# Patient Record
Sex: FEMALE | Race: AMERICAN INDIAN OR ALASKA NATIVE
[De-identification: names, ages, dates, MRNs, and addresses within clinical notes are randomized per-mention and may not be internally consistent; named-entity substitution may affect disease eponyms.]

---

## 2019-04-14 ENCOUNTER — HOSPITAL ENCOUNTER (EMERGENCY)
Dept: HOSPITAL 5 - ED | Age: 74
Discharge: HOME | End: 2019-04-14
Payer: COMMERCIAL

## 2019-04-14 VITALS — SYSTOLIC BLOOD PRESSURE: 93 MMHG | DIASTOLIC BLOOD PRESSURE: 54 MMHG

## 2019-04-14 DIAGNOSIS — E11.9: ICD-10-CM

## 2019-04-14 DIAGNOSIS — I10: ICD-10-CM
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
## 2019-04-14 DIAGNOSIS — S90.02XA: ICD-10-CM
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
## 2019-04-14 DIAGNOSIS — S80.01XA: ICD-10-CM

## 2019-04-14 DIAGNOSIS — S80.02XA: Primary | ICD-10-CM

## 2019-04-14 DIAGNOSIS — F17.200: ICD-10-CM

## 2019-04-14 PROCEDURE — 99283 EMERGENCY DEPT VISIT LOW MDM: CPT

## 2019-04-14 PROCEDURE — 73630 X-RAY EXAM OF FOOT: CPT

## 2019-04-14 PROCEDURE — 96372 THER/PROPH/DIAG INJ SC/IM: CPT

## 2019-04-14 PROCEDURE — 73610 X-RAY EXAM OF ANKLE: CPT

## 2019-04-14 PROCEDURE — 73562 X-RAY EXAM OF KNEE 3: CPT

## 2019-04-14 NOTE — XRAY REPORT
EXAM:    XR ANKLE 3+V LT 

 

HISTORY:  injury/FALL 

 

TECHNIQUE: 3 views 

 

COMPARISON: None available. 

 

FINDINGS:  

 

There is no acute bony fracture, or joint subluxation or dislocation seen. No evidence for inflammato
ry or degenerative arthritis is seen. No focal bone erosion or sclerosis is seen. There is a small, a
pproximately 7.8 mm, Achilles calcaneal bone spur in keeping with sequela of enthesopathy. 

 

There is peripheral atherosclerosis. Soft tissue swelling is seen around the ankle. No soft tissue em
physema, radiodense soft tissue mass or foreign body is seen. 

 

IMPRESSION:  

 

1.  No acute bony fracture, or joint subluxation or dislocation seen. 

 

2. Soft tissue swelling around the ankle. 

 

3.  No soft tissue emphysema, radiodense soft tissue mass or foreign body seen. 

 

4.  Consider follow-up evaluation with sectional imaging (CT and/or MRI) if symptoms persist or worse
n. 

 

  

 

This document is electronically signed by Miriam Levine MD., April 14 2019 09:51:19 AM [facility]

## 2019-04-14 NOTE — EMERGENCY DEPARTMENT REPORT
ED Fall HPI





- General


Chief Complaint: Extremity Injury, Lower


Stated Complaint: BILATERAL KNEE PAIN/FALL STANDING


Time Seen by Provider: 04/14/19 08:38


Source: EMS


Mode of arrival: Stretcher





- History of Present Illness


Initial Comments: 





Patient is 73 years old female with history of hypertension.  Patient brought to

the emergency room via EMS for evaluation of a fall that happened yesterday 

evening.  Patient stated that she was pushed by her dog and fell on both knees. 

Patient is complaining of bilateral knee pain and swelling and left ankle and 

foot pain.  Patient denied any head injury, neck injury, chest injury, abdominal

injury or other extremities injury.  Patient denied any loss of consciousness, 

headache, weakness, numbness or tingling sensation.  No bowel or bladder 

incontinence.  Patient denied any symptoms prior to the fall.


MD Complaint: fall


-: Last night


Fall From: standing


When Fall Occurred: 24 hours PTA


Fall Witnessed: yes, by family


Place Fall Occurred: home


Loss of Consciousness: none


Prolonged Down Time?: no


Symptoms Prior to Fall: none


Location - Extremities: Left: Knee, Ankle, Foot, Right: Knee


Severity: moderate


Severity scale (0 -10): 6


Associated Symptoms: denies





ED Review of Systems


ROS: 


Stated complaint: BILATERAL KNEE PAIN/FALL STANDING


Other details as noted in HPI





Comment: All other systems reviewed and negative


Constitutional: denies: chills, fever


Respiratory: denies: cough, orthopnea, shortness of breath, SOB with exertion


Cardiovascular: denies: chest pain, palpitations


Gastrointestinal: denies: abdominal pain, nausea, vomiting, diarrhea, 

constipation, hematemesis, melena, hematochezia


Genitourinary: denies: dysuria


Musculoskeletal: denies: back pain


Neurological: denies: headache, weakness, numbness, paresthesias, confusion





ED Past Medical Hx





- Past Medical History


Previous Medical History?: Yes


Hx Hypertension: Yes


Hx Diabetes: Yes





- Surgical History


Past Surgical History?: No





- Social History


Smoking Status: Current Every Day Smoker


Substance Use Type: None





ED Physical Exam





- General


Limitations: Physical Limitation


General appearance: alert, in no apparent distress





- Head


Head exam: Present: atraumatic, normocephalic, normal inspection





- Eye


Eye exam: Present: normal appearance, PERRL





- ENT


ENT exam: Present: normal exam, normal orophraynx, mucous membranes moist





- Neck


Neck exam: Present: normal inspection, full ROM.  Absent: tenderness, 

meningismus, lymphadenopathy, thyromegaly





- Respiratory


Respiratory exam: Present: normal lung sounds bilaterally





- Cardiovascular


Cardiovascular Exam: Present: regular rate, normal rhythm, normal heart sounds





- GI/Abdominal


GI/Abdominal exam: Present: soft, normal bowel sounds.  Absent: distended, 

tenderness, guarding, rebound, rigid, organomegaly, mass, bruit, pulsatile mass





- Expanded Lower Extremity Exam


  ** Right


Hip exam: Present: normal inspection, full ROM.  Absent: tenderness, swelling, 

abrasion


Upper Leg exam: Present: normal inspection, full ROM.  Absent: tenderness, 

swelling


Knee exam: Present: normal inspection, full ROM, tenderness, swelling.  Absent: 

abrasion, laceration, ecchymosis, deformity, crepidus, dislocation, erythema, 

effusion


Lower Leg exam: Present: normal inspection, full ROM


Ankle exam: Present: normal inspection, full ROM.  Absent: tenderness, swelling


Foot/Toe exam: Present: normal inspection, full ROM.  Absent: tenderness, 

swelling, abrasion, laceration, dislocation


Neuro vascular tendon exam: Present: no vascular compromise





  ** Left


Hip exam: Present: normal inspection, full ROM.  Absent: tenderness, swelling, 

abrasion


Upper Leg exam: Present: normal inspection, full ROM.  Absent: tenderness, 

swelling


Knee exam: Present: tenderness, swelling.  Absent: full ROM, abrasion, lacerati

on, ecchymosis, deformity, crepidus, dislocation, erythema, effusion, pain w/ 

pronation/supination


Lower Leg exam: Present: normal inspection, full ROM.  Absent: tenderness, s

welling, abrasion, deformity


Ankle exam: Present: tenderness, swelling.  Absent: abrasion, laceration, 

ecchymosis


Foot/Toe exam: Present: tenderness, swelling.  Absent: full ROM


Neuro vascular tendon exam: Present: no vascular compromise





- Back Exam


Back exam: Present: normal inspection





- Neurological Exam


Neurological exam: Present: alert, oriented X3, CN II-XII intact





- Skin


Skin exam: Present: warm, intact, normal color





ED Course


                                   Vital Signs











  04/14/19 04/14/19 04/14/19





  08:30 08:32 08:45


 


Temperature  99.1 F 


 


Pulse Rate 82 80 82


 


Respiratory 17 20 26 H





Rate   


 


Blood Pressure  126/71 110/66


 


O2 Sat by Pulse  98 95





Oximetry   














  04/14/19 04/14/19 04/14/19





  09:00 09:15 09:30


 


Temperature   


 


Pulse Rate 81 79 85


 


Respiratory 21 21 20





Rate   


 


Blood Pressure 124/73 122/69 111/75


 


O2 Sat by Pulse 96 96 95





Oximetry   














ED Medical Decision Making





- Radiology Data


Radiology results: report reviewed





X-ray of both knees, left ankle and left foot are all negative for acute 

finding.





- Medical Decision Making





Patient is 73 years old female with history of hypertension.  Patient brought to

the emergency room via EMS for evaluation of a fall that happened yesterday 

evening.  Patient stated that she was pushed by her dog and fell on both knees. 

Patient is complaining of bilateral knee pain and swelling and left ankle and 

foot pain.  Patient denied any head injury, neck injury, chest injury, abdominal

injury or other extremities injury.  Patient denied any loss of consciousness, 

headache, weakness, numbness or tingling sensation.  No bowel or bladder 

incontinence.  Patient denied any symptoms prior to the fall.








Patient x-ray is reviewed and is negative for acute finding.  Patient stated 

that she is feeling much better.  I advised the patient to follow up with her 

primary care physician in the next 2-3 days and to return to the ER if symptoms 

not improved.


Critical care attestation.: 


If time is entered above; I have spent that time in minutes in the direct care 

of this critically ill patient, excluding procedure time.








ED Disposition


Clinical Impression: 


 Fall, Contusion





Disposition: DC-01 TO HOME OR SELFCARE


Is pt being admited?: No


Condition: Stable


Instructions:  Contusion in Adults (ED), Fall Prevention for Older Adults (ED)


Referrals: 


PRIMARY CARE,MD [Primary Care Provider] - 3-5 Days

## 2019-04-14 NOTE — XRAY REPORT
EXAM:    XR FOOT 3+V LT 

 

HISTORY:  injury 

 

TECHNIQUE: 3 views 

 

COMPARISON: None available. 

 

FINDINGS:  

 

There is diffuse osteopenia. There is no acute bony fracture, or joint subluxation or dislocation see
n. No evidence for inflammatory or degenerative arthritis is seen. No focal bone erosion or sclerosis
 is seen. No plantar or Achilles calcaneal bone spurs seen. No soft tissue emphysema, radiodense soft
 tissue abnormality or foreign body is seen. 

 

IMPRESSION:  

 

1.  No acute bony fracture, or joint subluxation or dislocation seen. 

 

2.  No soft tissue emphysema, radiodense soft tissue abnormality or foreign body seen. 

 

  

 

This document is electronically signed by Miriam Levine MD., April 14 2019 10:09:15 AM ET

## 2019-04-14 NOTE — XRAY REPORT
EXAM:    XR KNEE BILAT 3V 

 

HISTORY: injury 

 

TECHNIQUE: 6 views 

 

COMPARISON: None available.  

 

FINDINGS:  

 

RIGHT KNEE: Diffuse osteopenia. There is no acute bony fracture, or joint subluxation or dislocation 
seen.  No focal bone erosion or sclerosis is seen. 

 

There is tricompartment osteoarthritis of the knee (especially severe in the medial joint compartment
), marked by joint space narrowings and prominent marginal osteophytosis. No evidence for inflammator
y arthritis is seen. 

 

There is a suprapatellar soft tissue density reminiscent of a gross joint effusion seen. There is sev
ere SFA, popliteal artery, and trifurcation runoff vessel atherosclerosis. No radiodense soft tissue 
mass or foreign body is seen. 

 

LEFT KNEE: Diffuse osteopenia. There is no acute bony fracture, or joint subluxation or dislocation s
een.  No focal bone erosion or sclerosis is seen. 

 

There is tricompartment osteoarthritis of the knee (especially severe in the medial joint compartment
), marked by joint space narrowings and prominent marginal osteophytosis. No evidence for inflammator
y arthritis is seen. 

 

There is a suprapatellar soft tissue density reminiscent of a gross joint effusion seen. There is sev
ere SFA, popliteal artery, and trifurcation runoff vessel atherosclerosis. No radiodense soft tissue 
mass or foreign body is seen. 

 

 

IMPRESSION:  

 

1.  No acute bony fracture, or joint subluxation or dislocation seen. 

 

2.  Moderate to severe bilateral tricompartment osteoarthritis, especially the medial joint compartme
nts and patellofemoral joints. 

 

3.  Large bilateral joint effusions with distention of the suprapatellar bursae. 

 

4.  Severe peripheral atherosclerosis.  

 

This document is electronically signed by Miriam Levine MD., April 14 2019 09:49:00 AM ET

## 2021-03-31 ENCOUNTER — OUT OF OFFICE VISIT (OUTPATIENT)
Dept: URBAN - METROPOLITAN AREA MEDICAL CENTER 12 | Facility: MEDICAL CENTER | Age: 76
End: 2021-03-31
Payer: COMMERCIAL

## 2021-03-31 DIAGNOSIS — R13.19 CERVICAL DYSPHAGIA: ICD-10-CM

## 2021-03-31 DIAGNOSIS — K31.89 ACQUIRED DEFORMITY OF DUODENUM: ICD-10-CM

## 2021-03-31 DIAGNOSIS — K22.4 ESOPHAGEAL DYSMOTILITY: ICD-10-CM

## 2021-03-31 PROCEDURE — 43239 EGD BIOPSY SINGLE/MULTIPLE: CPT | Performed by: INTERNAL MEDICINE

## 2021-03-31 PROCEDURE — 43450 DILATE ESOPHAGUS 1/MULT PASS: CPT | Performed by: INTERNAL MEDICINE

## 2021-03-31 PROCEDURE — G8427 DOCREV CUR MEDS BY ELIG CLIN: HCPCS | Performed by: INTERNAL MEDICINE

## 2021-03-31 PROCEDURE — 99222 1ST HOSP IP/OBS MODERATE 55: CPT | Performed by: INTERNAL MEDICINE

## 2022-06-18 ENCOUNTER — HOSPITAL ENCOUNTER (INPATIENT)
Dept: HOSPITAL 5 - ED | Age: 77
LOS: 3 days | Discharge: HOME | DRG: 291 | End: 2022-06-21
Attending: INTERNAL MEDICINE | Admitting: HOSPITALIST
Payer: MEDICARE

## 2022-06-18 DIAGNOSIS — I50.23: ICD-10-CM

## 2022-06-18 DIAGNOSIS — Z86.73: ICD-10-CM

## 2022-06-18 DIAGNOSIS — I11.0: Primary | ICD-10-CM

## 2022-06-18 DIAGNOSIS — Z71.6: ICD-10-CM

## 2022-06-18 DIAGNOSIS — E11.65: ICD-10-CM

## 2022-06-18 DIAGNOSIS — G47.33: ICD-10-CM

## 2022-06-18 DIAGNOSIS — J96.01: ICD-10-CM

## 2022-06-18 DIAGNOSIS — Z85.3: ICD-10-CM

## 2022-06-18 DIAGNOSIS — F17.200: ICD-10-CM

## 2022-06-18 DIAGNOSIS — Z82.49: ICD-10-CM

## 2022-06-18 DIAGNOSIS — E66.9: ICD-10-CM

## 2022-06-18 DIAGNOSIS — M19.90: ICD-10-CM

## 2022-06-18 DIAGNOSIS — I48.0: ICD-10-CM

## 2022-06-18 DIAGNOSIS — Z79.01: ICD-10-CM

## 2022-06-18 DIAGNOSIS — Z90.710: ICD-10-CM

## 2022-06-18 LAB
ALBUMIN SERPL-MCNC: 4.1 G/DL (ref 3.9–5)
ALT SERPL-CCNC: 110 UNITS/L (ref 7–56)
BASOPHILS # (AUTO): 0 K/MM3 (ref 0–0.1)
BASOPHILS NFR BLD AUTO: 0.5 % (ref 0–1.8)
BUN SERPL-MCNC: 12 MG/DL (ref 7–17)
BUN/CREAT SERPL: 17 %
CALCIUM SERPL-MCNC: 9.6 MG/DL (ref 8.4–10.2)
EOSINOPHIL # BLD AUTO: 0 K/MM3 (ref 0–0.4)
EOSINOPHIL NFR BLD AUTO: 0.1 % (ref 0–4.3)
HCO3 BLDA-SCNC: 20.4 MMOL/L (ref 20–26)
HCT VFR BLD CALC: 33.9 % (ref 30.3–42.9)
HEMOLYSIS INDEX: 4
HGB BLD-MCNC: 10.5 GM/DL (ref 10.1–14.3)
LYMPHOCYTES # BLD AUTO: 0.8 K/MM3 (ref 1.2–5.4)
LYMPHOCYTES NFR BLD AUTO: 16 % (ref 13.4–35)
MCHC RBC AUTO-ENTMCNC: 31 % (ref 30–34)
MCV RBC AUTO: 74 FL (ref 79–97)
MONOCYTES # (AUTO): 0.2 K/MM3 (ref 0–0.8)
MONOCYTES % (AUTO): 4.4 % (ref 0–7.3)
PCO2 BLDA: 44.7 MM HG
PH BLDA: 7.28 PH UNITS (ref 7.35–7.45)
PLATELET # BLD: 180 K/MM3 (ref 140–440)
PO2 BLDA: 178.4 MM HG (ref 80–90)
RBC # BLD AUTO: 4.61 M/MM3 (ref 3.65–5.03)

## 2022-06-18 PROCEDURE — 80053 COMPREHEN METABOLIC PANEL: CPT

## 2022-06-18 PROCEDURE — 85730 THROMBOPLASTIN TIME PARTIAL: CPT

## 2022-06-18 PROCEDURE — 84484 ASSAY OF TROPONIN QUANT: CPT

## 2022-06-18 PROCEDURE — 94640 AIRWAY INHALATION TREATMENT: CPT

## 2022-06-18 PROCEDURE — 4A033R1 MEASUREMENT OF ARTERIAL SATURATION, PERIPHERAL, PERCUTANEOUS APPROACH: ICD-10-PCS | Performed by: HOSPITALIST

## 2022-06-18 PROCEDURE — 71045 X-RAY EXAM CHEST 1 VIEW: CPT

## 2022-06-18 PROCEDURE — 94644 CONT INHLJ TX 1ST HOUR: CPT

## 2022-06-18 PROCEDURE — 82803 BLOOD GASES ANY COMBINATION: CPT

## 2022-06-18 PROCEDURE — 36415 COLL VENOUS BLD VENIPUNCTURE: CPT

## 2022-06-18 PROCEDURE — 85610 PROTHROMBIN TIME: CPT

## 2022-06-18 PROCEDURE — 80048 BASIC METABOLIC PNL TOTAL CA: CPT

## 2022-06-18 PROCEDURE — 82565 ASSAY OF CREATININE: CPT

## 2022-06-18 PROCEDURE — 82962 GLUCOSE BLOOD TEST: CPT

## 2022-06-18 PROCEDURE — 84443 ASSAY THYROID STIM HORMONE: CPT

## 2022-06-18 PROCEDURE — 85025 COMPLETE CBC W/AUTO DIFF WBC: CPT

## 2022-06-18 PROCEDURE — 85027 COMPLETE CBC AUTOMATED: CPT

## 2022-06-18 PROCEDURE — 5A09357 ASSISTANCE WITH RESPIRATORY VENTILATION, LESS THAN 24 CONSECUTIVE HOURS, CONTINUOUS POSITIVE AIRWAY PRESSURE: ICD-10-PCS | Performed by: HOSPITALIST

## 2022-06-18 PROCEDURE — 93005 ELECTROCARDIOGRAM TRACING: CPT

## 2022-06-18 PROCEDURE — 83880 ASSAY OF NATRIURETIC PEPTIDE: CPT

## 2022-06-18 PROCEDURE — 99292 CRITICAL CARE ADDL 30 MIN: CPT

## 2022-06-18 RX ADMIN — Medication SCH ML: at 21:30

## 2022-06-18 RX ADMIN — IPRATROPIUM BROMIDE AND ALBUTEROL SULFATE SCH AMPUL: .5; 3 SOLUTION RESPIRATORY (INHALATION) at 21:26

## 2022-06-18 RX ADMIN — FAMOTIDINE SCH MG: 20 TABLET ORAL at 21:30

## 2022-06-18 RX ADMIN — HEPARIN SODIUM SCH UNIT: 5000 INJECTION, SOLUTION INTRAVENOUS; SUBCUTANEOUS at 21:30

## 2022-06-18 NOTE — HISTORY AND PHYSICAL REPORT
History of Present Illness


Date of examination: 06/18/22


Date of admission: 


06/18/22


Chief complaint: 





Dyspnea


Respiratory distress


History of present illness: 





76 years old female with history of high blood duration, diabetes and A. maribel was

brought to the emergency room because of shortness of breath that occurred 

today.  Patient was stating that she had some difficulty breathing and then went

unresponsive.  History is limited due to acuity of patient's condition.  EMS 

gave the patient follow-up of her son to calm her down because she wanted to 

leave the ambulance truck.  She has a history of atrial fibrillation she does 

not smoke.








In the emergency room patient BNP is 3670, troponin 0.010.  Chest x-ray 

compatible with CHF and glucose is 523.  We are going to admit the patient we 

will put the patient on CHF pathway, BiPAP and consult cardiology for evaluation





Past History


Past Medical History: atrial fib, diabetes, hypertension


Past Surgical History: No surgical history


Social history: smoking


Family history: hypertension





Medications and Allergies


                                    Allergies











Allergy/AdvReac Type Severity Reaction Status Date / Time


 


No Known Allergies Allergy   Verified 06/18/22 19:45











                                Home Medications











 Medication  Instructions  Recorded  Confirmed  Last Taken  Type


 


Ondansetron [Zofran Odt] 4 mg PO Q8HR PRN #14 tab.rapdis 04/14/19  Unknown Rx


 


traMADoL [Ultram 50 MG tab] 50 mg PO Q4HR PRN #14 tablet 04/14/19  Unknown Rx











Active Meds: 


Active Medications





Acetaminophen (Acetaminophen 325 Mg Tab)  650 mg PO Q4H PRN


   PRN Reason: Pain MILD(1-3)/Fever >100.5/HA


Albuterol (Albuterol 2.5 Mg/3 Ml Nebu)  2.5 mg IH Q3HRT PRN


   PRN Reason: Shortness Of Breath


Albuterol/Ipratropium (Ipratropium/Albuterol Sulfate 3 Ml Ampul.Neb)  1 ampul IH

Q6HRT ISAAC


Dextrose (Dextrose 50% In Water (25gm) 50 Ml Syringe)  50 ml IV Q30MIN PRN; 

Protocol


   PRN Reason: Hypoglycemia


Famotidine (Famotidine 20 Mg Tab)  20 mg PO BID ISAAC


Furosemide (Furosemide 40 Mg/4 Ml Inj)  40 mg IV BID@0600,1800 ISAAC


Heparin Sodium (Porcine) (Heparin 5,000 Unit/1 Ml Vial)  5,000 unit SUB-Q Q12HR 

ISAAC


Insulin Human Lispro (Insulin Lispro 100 Unit/Ml)  0 unit SUB-Q Q6HR ISAAC; 

Protocol


Morphine Sulfate (Morphine 2 Mg/1 Ml Inj)  2 mg IV Q4H PRN


   PRN Reason: Pain, Moderate (4-6)


Morphine Sulfate (Morphine 4 Mg/1 Ml Inj)  4 mg IV Q4H PRN


   PRN Reason: Pain , Severe (7-10)


Ondansetron HCl (Ondansetron 4 Mg/2 Ml Inj)  4 mg IV Q8H PRN


   PRN Reason: Nausea And Vomiting


Sodium Chloride (Sodium Chloride 0.9% 10 Ml Flush Syringe)  10 ml IV BID ISAAC


Sodium Chloride (Sodium Chloride 0.9% 10 Ml Flush Syringe)  10 ml IV PRN PRN


   PRN Reason: LINE FLUSH











Review of Systems


All systems: negative


Cardiovascular: orthopnea, edema, shortness of breath, dyspnea on exertion, 

paroxysmal nocturnal dyspnea


Respiratory: shortness of breath, dyspnea on exertion





Exam





- Constitutional


Vitals: 


                                        











Temp Pulse Resp BP Pulse Ox


 


    80   28 H  122/82   100 


 


    06/18/22 16:17  06/18/22 16:17  06/18/22 18:42  06/18/22 18:42











General appearance: Present: no acute distress, well-nourished





- EENT


Eyes: Present: PERRL


ENT: hearing intact, clear oral mucosa





- Neck


Neck: Present: supple, normal ROM





- Respiratory


Respiratory effort: normal


Respiratory: bilateral: rales





- Cardiovascular


Heart Sounds: Present: S1 & S2.  Absent: rub, click





- Extremities


Extremities: pulses symmetrical, No edema


Peripheral Pulses: within normal limits





- Abdominal


General gastrointestinal: Present: soft, non-tender, non-distended, normal bowel

sounds


Female genitourinary: Present: normal





- Integumentary


Integumentary: Present: clear, warm, dry





- Musculoskeletal


Musculoskeletal: gait normal, strength equal bilaterally





- Psychiatric


Psychiatric: appropriate mood/affect, intact judgment & insight





- Neurologic


Neurologic: CNII-XII intact, moves all extremities





HEART Score





- HEART Score


Troponin: 


                                        











Troponin T  < 0.010 ng/mL (0.00-0.029)   06/18/22  14:35    














Results





- Labs


CBC & Chem 7: 


                                 06/18/22 14:35





                                 06/18/22 14:35


Labs: 


                             Laboratory Last Values











WBC  5.0 K/mm3 (4.5-11.0)   06/18/22  14:35    


 


RBC  4.61 M/mm3 (3.65-5.03)   06/18/22  14:35    


 


Hgb  10.5 gm/dl (10.1-14.3)   06/18/22  14:35    


 


Hct  33.9 % (30.3-42.9)   06/18/22  14:35    


 


MCV  74 fl (79-97)  L  06/18/22  14:35    


 


MCH  23 pg (28-32)  L  06/18/22  14:35    


 


MCHC  31 % (30-34)   06/18/22  14:35    


 


RDW  15.4 % (13.2-15.2)  H  06/18/22  14:35    


 


Plt Count  180 K/mm3 (140-440)   06/18/22  14:35    


 


Lymph % (Auto)  16.0 % (13.4-35.0)   06/18/22  14:35    


 


Mono % (Auto)  4.4 % (0.0-7.3)   06/18/22  14:35    


 


Eos % (Auto)  0.1 % (0.0-4.3)   06/18/22  14:35    


 


Baso % (Auto)  0.5 % (0.0-1.8)   06/18/22  14:35    


 


Lymph # (Auto)  0.8 K/mm3 (1.2-5.4)  L  06/18/22  14:35    


 


Mono # (Auto)  0.2 K/mm3 (0.0-0.8)   06/18/22  14:35    


 


Eos # (Auto)  0.0 K/mm3 (0.0-0.4)   06/18/22  14:35    


 


Baso # (Auto)  0.0 K/mm3 (0.0-0.1)   06/18/22  14:35    


 


Seg Neutrophils %  79.0 % (40.0-70.0)  H  06/18/22  14:35    


 


Seg Neutrophils #  4.0 K/mm3 (1.8-7.7)   06/18/22  14:35    


 


ABG pH  7.277 pH Units (7.350-7.450)  L  06/18/22  14:15    


 


ABG pCO2  44.7 mm Hg  06/18/22  14:15    


 


ABG pO2  178.4 mm Hg (80.0-90.0)  H  06/18/22  14:15    


 


ABG HCO3  20.4 mmol/L (20.0-26.0)   06/18/22  14:15    


 


ABG O2 Saturation  99.0 % (95.0-99.0)   06/18/22  14:15    


 


ABG O2 Content  15.2  (0.0-44)   06/18/22  14:15    


 


ABG Base Excess  -6.1 mmol/L (-2.0-3.0)  L  06/18/22  14:15    


 


ABG Hemoglobin  10.9 gm/dl (12.0-16.0)  L  06/18/22  14:15    


 


ABG Carboxyhemoglobin  1.6 % (0.0-5.0)   06/18/22  14:15    


 


ABG Methemoglobin  0.5 % (0.0-1.5)   06/18/22  14:15    


 


Oxyhemoglobin  97.0 % (95.0-99.0)   06/18/22  14:15    


 


FiO2  60 %  06/18/22  14:15    


 


Sodium  136 mmol/L (137-145)  L  06/18/22  14:35    


 


Potassium  4.3 mmol/L (3.6-5.0)   06/18/22  14:35    


 


Chloride  99.7 mmol/L ()   06/18/22  14:35    


 


Carbon Dioxide  18 mmol/L (22-30)  L  06/18/22  14:35    


 


Anion Gap  23 mmol/L  06/18/22  14:35    


 


BUN  12 mg/dL (7-17)   06/18/22  14:35    


 


Creatinine  0.7 mg/dL (0.6-1.2)   06/18/22  14:35    


 


Estimated GFR  > 60 ml/min  06/18/22  14:35    


 


BUN/Creatinine Ratio  17 %  06/18/22  14:35    


 


Glucose  523 mg/dL ()  H*  06/18/22  14:35    


 


Calcium  9.6 mg/dL (8.4-10.2)   06/18/22  14:35    


 


Total Bilirubin  0.40 mg/dL (0.1-1.2)   06/18/22  14:35    


 


AST  225 units/L (5-40)  H  06/18/22  14:35    


 


ALT  110 units/L (7-56)  H  06/18/22  14:35    


 


Alkaline Phosphatase  101 units/L ()   06/18/22  14:35    


 


Troponin T  < 0.010 ng/mL (0.00-0.029)   06/18/22  14:35    


 


NT-Pro-B Natriuret Pep  3670 pg/mL (0-900)  H  06/18/22  14:35    


 


Total Protein  6.9 g/dL (6.3-8.2)   06/18/22  14:35    


 


Albumin  4.1 g/dL (3.9-5)   06/18/22  14:35    


 


Albumin/Globulin Ratio  1.5 %  06/18/22  14:35    














- Imaging and Cardiology


Chest x-ray: report reviewed





Assessment and Plan


VTE prophylaxis?: Chemical


Plan of care discussed with patient/family: Yes





- Patient Problems


(1) Acute exacerbation of CHF (congestive heart failure)


Current Visit: Yes   Status: Acute   


Plan to address problem: 


Admit the patient to the medical telemetry.  Patient is on BiPAP.  Lasix 40 mg 

IV every 12 hours.  We will do the serial cardiac enzymes.  Echocardiogram, 

fluid restriction.  Maintain input output.  Cardiology evaluation








(2) Diabetes


Current Visit: Yes   Status: Acute   


Plan to address problem: 


Accu-Chek every 6 hours with Humalog high-dose coverage as.  Diabetic education








(3) Hypertension


Current Visit: Yes   Status: Acute   


Plan to address problem: 


Hydralazine 10 mg IV every 6 hours as needed.  We will continue the home 

medication








(4) A-fib


Current Visit: Yes   Status: Acute   


Plan to address problem: 


Stable.  We will continue the home medication.  Echocardiogram.  Cardiology 

evaluation








(5) Tobacco abuse


Current Visit: Yes   Status: Acute   


Plan to address problem: 


Patient is a former tobacco user.  We counseled regarding quitting smoking








(6) DVT prophylaxis


Current Visit: Yes   Status: Acute   


Plan to address problem: 


Heparin 5000 units subcu every 12 hours for DVT prophylaxis.  Pepcid 20 mg p.o. 

twice daily for GI prophylaxis.  Patient is a full code

## 2022-06-18 NOTE — XRAY REPORT
CHEST 1 VIEW 6/18/2022 5:52 PM



INDICATION / CLINICAL INFORMATION: sob.



COMPARISON: None available.



FINDINGS:



SUPPORT DEVICES: None.

HEART / MEDIASTINUM: No significant abnormality. 

LUNGS / PLEURA: Mild increased pulmonary vascularity with mild interstitial prominence within the jennifer
gs No pneumothorax. 



Signer Name: Bradley Edwards MD 

Signed: 6/18/2022 6:07 PM

Workstation Name: [id number]

## 2022-06-18 NOTE — EMERGENCY DEPARTMENT REPORT
ED General Adult HPI





- General


Chief complaint: Dyspnea/Respdistress


Stated complaint: RESP ARREST


Time Seen by Provider: 06/18/22 14:07


Source: EMS


Mode of arrival: Stretcher


Limitations: Other





- History of Present Illness


Initial comments: 


Patient presents with shortness of breath that occurred today.  Patient was 

stating that she had some difficulty breathing and then went unresponsive.  

History is limited due to acuity of patient's condition.  EMS gave the patient 

follow-up of her son to calm her down because she wanted to leave the ambulance 

truck.  She has a history of atrial fibrillation she does not smoke.








- Related Data


                                  Previous Rx's











 Medication  Instructions  Recorded  Last Taken  Type


 


Ondansetron [Zofran Odt] 4 mg PO Q8HR PRN #14 tab.rapdis 04/14/19 Unknown Rx


 


traMADoL [Ultram 50 MG tab] 50 mg PO Q4HR PRN #14 tablet 04/14/19 Unknown Rx











                                    Allergies











Allergy/AdvReac Type Severity Reaction Status Date / Time


 


No Known Allergies Allergy   Verified 06/18/22 19:45














ED Review of Systems


ROS: 


Stated complaint: RESP ARREST


Other details as noted in HPI





Constitutional: denies: chills, fever


Eyes: denies: eye pain, eye discharge, vision change


ENT: denies: ear pain, throat pain


Respiratory: shortness of breath.  denies: cough, wheezing


Cardiovascular: denies: chest pain, palpitations


Endocrine: no symptoms reported


Gastrointestinal: denies: abdominal pain, nausea, diarrhea


Genitourinary: denies: urgency, dysuria, discharge


Musculoskeletal: denies: back pain, joint swelling, arthralgia


Skin: denies: rash, lesions


Neurological: denies: headache, weakness, paresthesias


Psychiatric: denies: anxiety, depression


Hematological/Lymphatic: denies: easy bleeding, easy bruising





ED Past Medical Hx





- Past Medical History


Hx Hypertension: Yes


Hx Diabetes: Yes





- Social History


Smoking Status: Former Smoker


Substance Use Type: None





- Medications


Home Medications: 


                                Home Medications











 Medication  Instructions  Recorded  Confirmed  Last Taken  Type


 


Ondansetron [Zofran Odt] 4 mg PO Q8HR PRN #14 tab.rapdis 04/14/19  Unknown Rx


 


traMADoL [Ultram 50 MG tab] 50 mg PO Q4HR PRN #14 tablet 04/14/19  Unknown Rx














ED Physical Exam





- General


Limitations: Other


General appearance: alert, in no apparent distress





- Head


Head exam: Present: atraumatic, normocephalic





- Eye


Eye exam: Present: normal appearance





- ENT


ENT exam: Present: mucous membranes moist





- Neck


Neck exam: Present: normal inspection





- Respiratory


Respiratory exam: Present: normal lung sounds bilaterally.  Absent: respiratory 

distress





- Cardiovascular


Cardiovascular Exam: Present: regular rate, normal rhythm.  Absent: systolic 

murmur, diastolic murmur, rubs, gallop





- GI/Abdominal


GI/Abdominal exam: Present: soft, normal bowel sounds





- Extremities Exam


Extremities exam: Present: normal inspection





- Back Exam


Back exam: Present: normal inspection





- Neurological Exam


Neurological exam: Present: alert, oriented X3





- Psychiatric


Psychiatric exam: Present: normal affect, normal mood





- Skin


Skin exam: Present: warm, dry, intact, normal color.  Absent: rash





ED Course


                                   Vital Signs











  06/18/22 06/18/22 06/18/22





  14:05 16:17 18:42


 


Pulse Rate 99 H 80 


 


Respiratory 38 H 28 H 





Rate   


 


Blood Pressure   122/82





[Left]   


 


O2 Sat by Pulse 86 100 100





Oximetry   














ED Medical Decision Making





- Lab Data


Result diagrams: 


                                 06/18/22 14:35





                                 06/18/22 14:35








                                   Lab Results











  06/18/22 06/18/22 06/18/22 Range/Units





  14:15 14:35 14:35 


 


WBC   5.0   (4.5-11.0)  K/mm3


 


RBC   4.61   (3.65-5.03)  M/mm3


 


Hgb   10.5   (10.1-14.3)  gm/dl


 


Hct   33.9   (30.3-42.9)  %


 


MCV   74 L   (79-97)  fl


 


MCH   23 L   (28-32)  pg


 


MCHC   31   (30-34)  %


 


RDW   15.4 H   (13.2-15.2)  %


 


Plt Count   180   (140-440)  K/mm3


 


Lymph % (Auto)   16.0   (13.4-35.0)  %


 


Mono % (Auto)   4.4   (0.0-7.3)  %


 


Eos % (Auto)   0.1   (0.0-4.3)  %


 


Baso % (Auto)   0.5   (0.0-1.8)  %


 


Lymph # (Auto)   0.8 L   (1.2-5.4)  K/mm3


 


Mono # (Auto)   0.2   (0.0-0.8)  K/mm3


 


Eos # (Auto)   0.0   (0.0-0.4)  K/mm3


 


Baso # (Auto)   0.0   (0.0-0.1)  K/mm3


 


Seg Neutrophils %   79.0 H   (40.0-70.0)  %


 


Seg Neutrophils #   4.0   (1.8-7.7)  K/mm3


 


ABG pH  7.277 L    (7.350-7.450)  pH Units


 


ABG pCO2  44.7    mm Hg


 


ABG pO2  178.4 H    (80.0-90.0)  mm Hg


 


ABG HCO3  20.4    (20.0-26.0)  mmol/L


 


ABG O2 Saturation  99.0    (95.0-99.0)  %


 


ABG O2 Content  15.2    (0.0-44)  


 


ABG Base Excess  -6.1 L    (-2.0-3.0)  mmol/L


 


ABG Hemoglobin  10.9 L    (12.0-16.0)  gm/dl


 


ABG Carboxyhemoglobin  1.6    (0.0-5.0)  %


 


ABG Methemoglobin  0.5    (0.0-1.5)  %


 


Oxyhemoglobin  97.0    (95.0-99.0)  %


 


FiO2  60    %


 


Sodium    136 L  (137-145)  mmol/L


 


Potassium    4.3  (3.6-5.0)  mmol/L


 


Chloride    99.7  ()  mmol/L


 


Carbon Dioxide    18 L  (22-30)  mmol/L


 


Anion Gap    23  mmol/L


 


BUN    12  (7-17)  mg/dL


 


Creatinine    0.7  (0.6-1.2)  mg/dL


 


Estimated GFR    > 60  ml/min


 


BUN/Creatinine Ratio    17  %


 


Glucose    523 H*  ()  mg/dL


 


Calcium    9.6  (8.4-10.2)  mg/dL


 


Total Bilirubin    0.40  (0.1-1.2)  mg/dL


 


AST    225 H  (5-40)  units/L


 


ALT    110 H  (7-56)  units/L


 


Alkaline Phosphatase    101  ()  units/L


 


Troponin T     (0.00-0.029)  ng/mL


 


NT-Pro-B Natriuret Pep     (0-900)  pg/mL


 


Total Protein    6.9  (6.3-8.2)  g/dL


 


Albumin    4.1  (3.9-5)  g/dL


 


Albumin/Globulin Ratio    1.5  %














  06/18/22 Range/Units





  14:35 


 


WBC   (4.5-11.0)  K/mm3


 


RBC   (3.65-5.03)  M/mm3


 


Hgb   (10.1-14.3)  gm/dl


 


Hct   (30.3-42.9)  %


 


MCV   (79-97)  fl


 


MCH   (28-32)  pg


 


MCHC   (30-34)  %


 


RDW   (13.2-15.2)  %


 


Plt Count   (140-440)  K/mm3


 


Lymph % (Auto)   (13.4-35.0)  %


 


Mono % (Auto)   (0.0-7.3)  %


 


Eos % (Auto)   (0.0-4.3)  %


 


Baso % (Auto)   (0.0-1.8)  %


 


Lymph # (Auto)   (1.2-5.4)  K/mm3


 


Mono # (Auto)   (0.0-0.8)  K/mm3


 


Eos # (Auto)   (0.0-0.4)  K/mm3


 


Baso # (Auto)   (0.0-0.1)  K/mm3


 


Seg Neutrophils %   (40.0-70.0)  %


 


Seg Neutrophils #   (1.8-7.7)  K/mm3


 


ABG pH   (7.350-7.450)  pH Units


 


ABG pCO2   mm Hg


 


ABG pO2   (80.0-90.0)  mm Hg


 


ABG HCO3   (20.0-26.0)  mmol/L


 


ABG O2 Saturation   (95.0-99.0)  %


 


ABG O2 Content   (0.0-44)  


 


ABG Base Excess   (-2.0-3.0)  mmol/L


 


ABG Hemoglobin   (12.0-16.0)  gm/dl


 


ABG Carboxyhemoglobin   (0.0-5.0)  %


 


ABG Methemoglobin   (0.0-1.5)  %


 


Oxyhemoglobin   (95.0-99.0)  %


 


FiO2   %


 


Sodium   (137-145)  mmol/L


 


Potassium   (3.6-5.0)  mmol/L


 


Chloride   ()  mmol/L


 


Carbon Dioxide   (22-30)  mmol/L


 


Anion Gap   mmol/L


 


BUN   (7-17)  mg/dL


 


Creatinine   (0.6-1.2)  mg/dL


 


Estimated GFR   ml/min


 


BUN/Creatinine Ratio   %


 


Glucose   ()  mg/dL


 


Calcium   (8.4-10.2)  mg/dL


 


Total Bilirubin   (0.1-1.2)  mg/dL


 


AST   (5-40)  units/L


 


ALT   (7-56)  units/L


 


Alkaline Phosphatase   ()  units/L


 


Troponin T  < 0.010  (0.00-0.029)  ng/mL


 


NT-Pro-B Natriuret Pep  3670 H  (0-900)  pg/mL


 


Total Protein   (6.3-8.2)  g/dL


 


Albumin   (3.9-5)  g/dL


 


Albumin/Globulin Ratio   %














- Medical Decision Making


Chief medical diagnosis congestive heart failure


Differential medical diagnosis pulmonary edema, acute respiratory failure, 

pneumonia





I will give IV Lasix I will place the patient on BiPAP I will get CBC BMP EKG 

and I will admit the patient to the hospital.





Critical Care Time: Yes


Critical care time in (mins) excluding proc time.: 120


Critical care attestation.: 


If time is entered above; I have spent that time in minutes in the direct care 

of this critically ill patient, excluding procedure time.








ED Disposition


Clinical Impression: 


CHF (congestive heart failure), NYHA class III


Qualifiers:


 Congestive heart failure type: combined Congestive heart failure chronicity: 

unspecified Qualified Code(s): I50.40 - Unspecified combined systolic 

(congestive) and diastolic (congestive) heart failure





Acute respiratory failure


Qualifiers:


 Respiratory failure complication: unspecified whether with hypoxia or 

hypercapnia Qualified Code(s): J96.00 - Acute respiratory failure, unspecified 

whether with hypoxia or hypercapnia





Disposition: 09 ADMITTED AS INPATIENT


Is pt being admited?: Yes


Does the pt Need Aspirin: No


Condition: Stable

## 2022-06-19 LAB
APTT BLD: 35.3 SEC. (ref 24.2–36.6)
BASOPHILS # (AUTO): 0.1 K/MM3 (ref 0–0.1)
BASOPHILS NFR BLD AUTO: 0.8 % (ref 0–1.8)
BUN SERPL-MCNC: 14 MG/DL (ref 7–17)
BUN/CREAT SERPL: 20 %
CALCIUM SERPL-MCNC: 9.6 MG/DL (ref 8.4–10.2)
EOSINOPHIL # BLD AUTO: 0.1 K/MM3 (ref 0–0.4)
EOSINOPHIL NFR BLD AUTO: 1.3 % (ref 0–4.3)
HCT VFR BLD CALC: 32.1 % (ref 30.3–42.9)
HCT VFR BLD CALC: 33.1 % (ref 30.3–42.9)
HEMOLYSIS INDEX: 1
HGB BLD-MCNC: 10 GM/DL (ref 10.1–14.3)
HGB BLD-MCNC: 10.2 GM/DL (ref 10.1–14.3)
INR PPP: 0.95 (ref 0.87–1.13)
LYMPHOCYTES # BLD AUTO: 3 K/MM3 (ref 1.2–5.4)
LYMPHOCYTES NFR BLD AUTO: 45.5 % (ref 13.4–35)
MCHC RBC AUTO-ENTMCNC: 31 % (ref 30–34)
MCHC RBC AUTO-ENTMCNC: 31 % (ref 30–34)
MCV RBC AUTO: 72 FL (ref 79–97)
MCV RBC AUTO: 72 FL (ref 79–97)
MONOCYTES # (AUTO): 0.5 K/MM3 (ref 0–0.8)
MONOCYTES % (AUTO): 8.2 % (ref 0–7.3)
PLATELET # BLD: 181 K/MM3 (ref 140–440)
PLATELET # BLD: 184 K/MM3 (ref 140–440)
RBC # BLD AUTO: 4.44 M/MM3 (ref 3.65–5.03)
RBC # BLD AUTO: 4.56 M/MM3 (ref 3.65–5.03)

## 2022-06-19 RX ADMIN — FUROSEMIDE SCH MG: 10 INJECTION, SOLUTION INTRAVENOUS at 17:56

## 2022-06-19 RX ADMIN — INSULIN LISPRO SCH: 100 INJECTION, SOLUTION INTRAVENOUS; SUBCUTANEOUS at 04:34

## 2022-06-19 RX ADMIN — FAMOTIDINE SCH MG: 20 TABLET ORAL at 10:13

## 2022-06-19 RX ADMIN — IPRATROPIUM BROMIDE AND ALBUTEROL SULFATE SCH AMPUL: .5; 3 SOLUTION RESPIRATORY (INHALATION) at 21:12

## 2022-06-19 RX ADMIN — IPRATROPIUM BROMIDE AND ALBUTEROL SULFATE SCH AMPUL: .5; 3 SOLUTION RESPIRATORY (INHALATION) at 08:56

## 2022-06-19 RX ADMIN — FAMOTIDINE SCH MG: 20 TABLET ORAL at 21:58

## 2022-06-19 RX ADMIN — HEPARIN SODIUM SCH UNIT: 5000 INJECTION, SOLUTION INTRAVENOUS; SUBCUTANEOUS at 10:13

## 2022-06-19 RX ADMIN — INSULIN LISPRO SCH: 100 INJECTION, SOLUTION INTRAVENOUS; SUBCUTANEOUS at 17:22

## 2022-06-19 RX ADMIN — IPRATROPIUM BROMIDE AND ALBUTEROL SULFATE SCH AMPUL: .5; 3 SOLUTION RESPIRATORY (INHALATION) at 14:50

## 2022-06-19 RX ADMIN — INSULIN LISPRO SCH UNIT: 100 INJECTION, SOLUTION INTRAVENOUS; SUBCUTANEOUS at 05:32

## 2022-06-19 RX ADMIN — Medication SCH ML: at 10:13

## 2022-06-19 RX ADMIN — IPRATROPIUM BROMIDE AND ALBUTEROL SULFATE SCH AMPUL: .5; 3 SOLUTION RESPIRATORY (INHALATION) at 02:18

## 2022-06-19 RX ADMIN — Medication SCH ML: at 22:07

## 2022-06-19 RX ADMIN — INSULIN LISPRO SCH UNIT: 100 INJECTION, SOLUTION INTRAVENOUS; SUBCUTANEOUS at 22:03

## 2022-06-19 RX ADMIN — METOPROLOL SUCCINATE SCH MG: 25 TABLET, FILM COATED, EXTENDED RELEASE ORAL at 22:00

## 2022-06-19 RX ADMIN — FUROSEMIDE SCH MG: 10 INJECTION, SOLUTION INTRAVENOUS at 05:41

## 2022-06-19 RX ADMIN — METOPROLOL SUCCINATE SCH MG: 25 TABLET, FILM COATED, EXTENDED RELEASE ORAL at 17:38

## 2022-06-19 NOTE — ELECTROCARDIOGRAPH REPORT
[facility]

                                       

Test Date:    2022               Test Time:    07:43:14

Pat Name:     MARC ZUNIGA              Department:   

Patient ID:   [id number]          Room:         A473 1

Gender:       F                        Technician:   ZEYAD

:          1945               Requested By: CATY MANCILLA

Order Number: [id number]              Reading MD:   Ayo Palafox

                                 Measurements

Intervals                              Axis          

Rate:         79                       P:            0

SC:           80                       QRS:          -31

QRSD:         100                      T:            205

QT:           412                                    

QTc:          465                                    

                           Interpretive Statements

Sinus rhythm

Atrial premature complex

LVH

T wave abnormalities, consider anterolateral ischemia

No previous ECG available for comparison

Electronically Signed On 2022 10:49:52 EDT by Ayo Palafox

## 2022-06-19 NOTE — PROGRESS NOTE
Assessment and Plan


Assessment and plan: 


VTE prophylaxis?: Chemical


Plan of care discussed with patient/family: Yes





- Patient Problems


--Acute hypoxic respiratory failure; present on admission


Requiring BiPAP, wean as tolerated


Oxygen titrate O2 sats more than 90%


Treat underlying congestive heart failure diuretics


Nebulizers if needed





- Acute exacerbation of systolic CHF (congestive heart failure)


Admit the patient to the medical telemetry.  Patient is on BiPAP.  


Lasix 40 mg IV every 12 hours.  We will do the serial cardiac enzymes.  


Echocardiogram, fluid restriction.  Maintain input output.  Cardiology 

evaluation


LVEF; 40 to 45%





-- Diabetes melitis/uncontrolled


Accu-Chek every 6 hours with Humalog high-dose coverage as.  


Sliding scale coverage, glipizide added


Closely monitor,Diabetic education





-- Hypertension


Hydralazine 10 mg IV every 6 hours as needed.  


We will continue the home medication





--Paroxysmal A-fib rate controlled


Stable.  We will continue the home medication.  


Echocardiogram.  Cardiology evaluation


On beta-blockers and chronic anticoagulant Eliquis


changed to Xarelto


Cardiology following





-- Tobacco abuse


Patient is a former tobacco user.  


We counseled regarding quitting smoking





--Obesity; BMI 35.6;


Diet modification, lifestyle changes, exercise as tolerated


And weight reduction when medically stable





 --Full CODE STATUS 





--DVT prophylaxis


Heparin 5000 units subcu every 12 hours for DVT prophylaxis.  


Pepcid 20 mg p.o. twice daily for GI prophylaxis.  Patient is a full code





Closely monitor the patient and adjust management as needed


Cardiology consult and recommendations noted and appreciated


Discharge planning per case management when patient is stable





6/20/2022; cardiology initiated IV digoxin


Closely monitor, discharge when medically stable and cleared by cardiology











History


Interval history: 


I have seen and evaluated the patient


Patient's chart and medications reviewed


Patient has mild shortness of breath significantly improved since yesterday


Vital signs reviewed








Hospitalist Physical





- Constitutional


Vitals: 


                                        











Temp Pulse Resp BP Pulse Ox


 


 98.9 F   124 H  18   128/57   98 


 


 06/19/22 05:42  06/19/22 14:50  06/19/22 14:50  06/19/22 05:42  06/19/22 08:58











General appearance: Present: no acute distress, well-nourished, obese (Morbidly 

obese)





- EENT


Eyes: Present: PERRL, EOM intact





- Neck


Neck: Present: supple, normal ROM





- Respiratory


Respiratory effort: normal


Respiratory: bilateral: diminished, negative: rales, rhonchi, wheezing





- Cardiovascular


Rhythm: regular


Heart Sounds: Present: S1 & S2





- Extremities


Extremities: no ischemia, No edema





- Abdominal


General gastrointestinal: soft, non-tender, non-distended, normal bowel sounds





- Integumentary


Integumentary: Present: clear, warm





- Psychiatric


Psychiatric: appropriate mood/affect, cooperative





- Neurologic


Neurologic: moves all extremities





HEART Score





- HEART Score


Troponin: 


                                        











Troponin T  < 0.010 ng/mL (0.00-0.029)   06/18/22  14:35    














Results





- Labs


CBC & Chem 7: 


                                 06/20/22 12:03





                                 06/20/22 12:03


Labs: 


                             Laboratory Last Values











WBC  6.0 K/mm3 (4.5-11.0)   06/19/22  16:38    


 


RBC  4.56 M/mm3 (3.65-5.03)   06/19/22  16:38    


 


Hgb  10.2 gm/dl (10.1-14.3)   06/19/22  16:38    


 


Hct  33.1 % (30.3-42.9)   06/19/22  16:38    


 


MCV  72 fl (79-97)  L  06/19/22  16:38    


 


MCH  22 pg (28-32)  L  06/19/22  16:38    


 


MCHC  31 % (30-34)   06/19/22  16:38    


 


RDW  14.9 % (13.2-15.2)   06/19/22  16:38    


 


Plt Count  184 K/mm3 (140-440)   06/19/22  16:38    


 


Lymph % (Auto)  45.5 % (13.4-35.0)  H  06/19/22  05:35    


 


Mono % (Auto)  8.2 % (0.0-7.3)  H  06/19/22  05:35    


 


Eos % (Auto)  1.3 % (0.0-4.3)   06/19/22  05:35    


 


Baso % (Auto)  0.8 % (0.0-1.8)   06/19/22  05:35    


 


Lymph # (Auto)  3.0 K/mm3 (1.2-5.4)   06/19/22  05:35    


 


Mono # (Auto)  0.5 K/mm3 (0.0-0.8)   06/19/22  05:35    


 


Eos # (Auto)  0.1 K/mm3 (0.0-0.4)   06/19/22  05:35    


 


Baso # (Auto)  0.1 K/mm3 (0.0-0.1)   06/19/22  05:35    


 


Seg Neutrophils %  44.2 % (40.0-70.0)   06/19/22  05:35    


 


Seg Neutrophils #  2.9 K/mm3 (1.8-7.7)   06/19/22  05:35    


 


PT  13.7 Sec. (12.2-14.9)   06/19/22  16:38    


 


INR  0.95  (0.87-1.13)   06/19/22  16:38    


 


APTT  35.3 Sec. (24.2-36.6)   06/19/22  16:38    


 


ABG pH  7.277 pH Units (7.350-7.450)  L  06/18/22  14:15    


 


ABG pCO2  44.7 mm Hg  06/18/22  14:15    


 


ABG pO2  178.4 mm Hg (80.0-90.0)  H  06/18/22  14:15    


 


ABG HCO3  20.4 mmol/L (20.0-26.0)   06/18/22  14:15    


 


ABG O2 Saturation  99.0 % (95.0-99.0)   06/18/22  14:15    


 


ABG O2 Content  15.2  (0.0-44)   06/18/22  14:15    


 


ABG Base Excess  -6.1 mmol/L (-2.0-3.0)  L  06/18/22  14:15    


 


ABG Hemoglobin  10.9 gm/dl (12.0-16.0)  L  06/18/22  14:15    


 


ABG Carboxyhemoglobin  1.6 % (0.0-5.0)   06/18/22  14:15    


 


ABG Methemoglobin  0.5 % (0.0-1.5)   06/18/22  14:15    


 


Oxyhemoglobin  97.0 % (95.0-99.0)   06/18/22  14:15    


 


FiO2  60 %  06/18/22  14:15    


 


Sodium  140 mmol/L (137-145)   06/19/22  05:35    


 


Potassium  3.9 mmol/L (3.6-5.0)   06/19/22  05:35    


 


Chloride  100.1 mmol/L ()   06/19/22  05:35    


 


Carbon Dioxide  22 mmol/L (22-30)   06/19/22  05:35    


 


Anion Gap  22 mmol/L  06/19/22  05:35    


 


BUN  14 mg/dL (7-17)   06/19/22  05:35    


 


Creatinine  0.7 mg/dL (0.6-1.2)   06/19/22  16:38    


 


Estimated GFR  > 60 ml/min  06/19/22  16:38    


 


BUN/Creatinine Ratio  20 %  06/19/22  05:35    


 


Glucose  411 mg/dL ()  H  06/19/22  05:35    


 


POC Glucose  368 mg/dL ()  H  06/19/22  05:24    


 


Calcium  9.6 mg/dL (8.4-10.2)   06/19/22  05:35    


 


Total Bilirubin  0.40 mg/dL (0.1-1.2)   06/18/22  14:35    


 


AST  225 units/L (5-40)  H  06/18/22  14:35    


 


ALT  110 units/L (7-56)  H  06/18/22  14:35    


 


Alkaline Phosphatase  101 units/L ()   06/18/22  14:35    


 


Troponin T  < 0.010 ng/mL (0.00-0.029)   06/18/22  14:35    


 


NT-Pro-B Natriuret Pep  3670 pg/mL (0-900)  H  06/18/22  14:35    


 


Total Protein  6.9 g/dL (6.3-8.2)   06/18/22  14:35    


 


Albumin  4.1 g/dL (3.9-5)   06/18/22  14:35    


 


Albumin/Globulin Ratio  1.5 %  06/18/22  14:35    











Valentine/IV: 


                                        





Voiding Method                   Toilet











Active Medications





- Current Medications


Current Medications: 














Generic Name Dose Route Start Last Admin





  Trade Name Freq  PRN Reason Stop Dose Admin


 


Acetaminophen  650 mg  06/18/22 19:37 





  Acetaminophen 325 Mg Tab  PO  





  Q4H PRN  





  Pain MILD(1-3)/Fever >100.5/HA  


 


Albuterol  2.5 mg  06/18/22 19:37 





  Albuterol 2.5 Mg/3 Ml Nebu  IH  





  Q3HRT PRN  





  Shortness Of Breath  


 


Albuterol/Ipratropium  1 ampul  06/18/22 20:00  06/19/22 14:50





  Ipratropium/Albuterol Sulfate 3 Ml Ampul.Neb  IH   1 ampul





  Q6HRT ISAAC   Administration


 


Apixaban  5 mg  06/19/22 22:00 





  Apixaban 5 Mg Tab  PO  





  Q12HR On license of [facility]  





  Protocol  


 


Dextrose  50 ml  06/18/22 19:37 





  Dextrose 50% In Water (25gm) 50 Ml Syringe  IV  





  Q30MIN PRN  





  Hypoglycemia  





  Protocol  


 


Famotidine  20 mg  06/18/22 22:00  06/19/22 10:13





  Famotidine 20 Mg Tab  PO   20 mg





  BID ISAAC   Administration


 


Furosemide  40 mg  06/19/22 06:00  06/19/22 17:56





  Furosemide 40 Mg/4 Ml Inj  IV   40 mg





  BID@0600,1800 ISAAC   Administration


 


Glipizide  10 mg  06/20/22 08:00 





  Glipizide Xl 10 Mg Tab  PO  





  QDDIAB ISAAC  


 


Insulin Human Lispro  0 unit  06/19/22 22:00 





  Insulin Lispro 100 Unit/Ml  SUB-Q  





  ACHS On license of [facility]  





  Protocol  


 


Metoprolol Succinate  12.5 mg  06/19/22 16:00  06/19/22 17:38





  Metoprolol Succinate Xl 25 Mg Tab  PO   12.5 mg





  BID ISAAC   Administration


 


Morphine Sulfate  2 mg  06/18/22 19:37 





  Morphine 2 Mg/1 Ml Inj  IV  





  Q4H PRN  





  Pain, Moderate (4-6)  


 


Morphine Sulfate  4 mg  06/18/22 19:37 





  Morphine 4 Mg/1 Ml Inj  IV  





  Q4H PRN  





  Pain , Severe (7-10)  


 


Ondansetron HCl  4 mg  06/18/22 19:37 





  Ondansetron 4 Mg/2 Ml Inj  IV  





  Q8H PRN  





  Nausea And Vomiting  


 


Sodium Chloride  10 ml  06/18/22 22:00  06/19/22 10:13





  Sodium Chloride 0.9% 10 Ml Flush Syringe  IV   10 ml





  BID ISAAC   Administration


 


Sodium Chloride  10 ml  06/18/22 19:37 





  Sodium Chloride 0.9% 10 Ml Flush Syringe  IV  





  PRN PRN  





  LINE FLUSH

## 2022-06-19 NOTE — CONSULTATION
History of Present Illness


Consult date: 06/19/22


Requesting physician: NORM AMAYA


Consult reason: congestive heart failure


History of present illness: 


The patient has a history of paroxysmal atrial fibrillation that was initially 

diagnosed after presenting to [facility] on 5/17/22. She was also treated for HF 

during that hospitalization. She claims that for the past few days, she has been

experiencing progressively worsening shortness of breath. She claims that she u

sed her COPD inhaler without improvement. On the day of presentation, the 

patient reportedly developed shortness of breath and subsequently lost 

consciousness. She does not recall the events other than arousing in the 

ambulance. According to the EMS, she was hypoxic upon initial contact. She 

denies chest pain, palpitations, or dizziness. CXR showed pulmonary vascular 

congestion. On telemetry, she has been demonstrating intermittent sinus rhythm 

with paroxysms of AF with RVR. She claims that her cardiologist, Dr. Prasad Alcantar performed a stress test on her last Tuesday. Result is pending.


Echocardiogram of 5/18/22 revealed an ejection fraction of 40  45% with 

inferior and inferolateral WMA. 











Past History


Past Medical History: atrial fib (PAF diagnosed 5/17/22.), arthritis, COPD, 

diabetes, heart failure, hypertension, other (CARMELITA - she does not use CPAP; 

breast cancer, CVA.)


Past Surgical History: No surgical history, hysterectomy, mastectomy (right 

lumpectomy), Other (ovarian cystectomy, left knee surgery)


Social history: smoking (she's a former smoker)


Family history: no significant family history





Medications and Allergies


                                    Allergies











Allergy/AdvReac Type Severity Reaction Status Date / Time


 


No Known Allergies Allergy   Verified 06/18/22 19:45











                                Home Medications











 Medication  Instructions  Recorded  Confirmed  Last Taken  Type


 


Cetirizine HCl [Zyrtec 10mg tab] 10 mg PO DAILY 06/19/22 06/19/22 06/17/22 

History


 


Meloxicam [Mobic] 15 mg PO QDAY 06/19/22 06/19/22 06/17/22 History


 


Metformin HCl [metFORMIN] 1,000 mg PO BID 06/19/22 06/19/22 06/17/22 History


 


Metoprolol Succinate [Kapspargo 25 mg PO DAILY 06/19/22 06/19/22 06/17/22 

History





Sprinkle]     


 


Rivaroxaban [Xarelto] 20 mg PO QDAY 06/19/22 06/19/22 06/17/22 History


 


Rosuvastatin Calcium 10 mg PO DAILY 06/19/22 06/19/22 06/17/22 History


 


amLODIPine [Norvasc] 10 mg PO DAILY 06/19/22 06/19/22 06/17/22 History


 


glipiZIDE [Glucotrol] 10 mg PO QDAY 06/19/22 06/19/22 06/17/22 History











Active Meds: 


Active Medications





Acetaminophen (Acetaminophen 325 Mg Tab)  650 mg PO Q4H PRN


   PRN Reason: Pain MILD(1-3)/Fever >100.5/HA


Albuterol (Albuterol 2.5 Mg/3 Ml Nebu)  2.5 mg IH Q3HRT PRN


   PRN Reason: Shortness Of Breath


Albuterol/Ipratropium (Ipratropium/Albuterol Sulfate 3 Ml Ampul.Neb)  1 ampul IH

 Q6HRT [facility]


   Last Admin: 06/19/22 14:50 Dose:  1 ampul


   


Dextrose (Dextrose 50% In Water (25gm) 50 Ml Syringe)  50 ml IV Q30MIN PRN; 

Protocol


   PRN Reason: Hypoglycemia


Famotidine (Famotidine 20 Mg Tab)  20 mg PO BID [facility]


   Last Admin: 06/19/22 10:13 Dose:  20 mg


   


Furosemide (Furosemide 40 Mg/4 Ml Inj)  40 mg IV BID@0600,1800 [facility]


   Last Admin: 06/19/22 05:41 Dose:  40 mg


   


Glipizide (Glipizide Xl 10 Mg Tab)  10 mg PO QDDIAB [facility]


Heparin Sodium (Porcine) (Heparin 5,000 Unit/1 Ml Vial)  5,000 unit SUB-Q Q12HR 

[facility]


   Last Admin: 06/19/22 10:13 Dose:  5,000 unit


   


Hydralazine HCl (Hydralazine 20 Mg/1 Ml Inj)  10 mg IV Q6H PRN


   PRN Reason: Blood Pressure


Insulin Human Lispro (Insulin Lispro 100 Unit/Ml)  0 unit SUB-Q Q6HR [facility]; 

Protocol


   Last Admin: 06/19/22 05:32 Dose:  10 unit


   


Morphine Sulfate (Morphine 2 Mg/1 Ml Inj)  2 mg IV Q4H PRN


   PRN Reason: Pain, Moderate (4-6)


Morphine Sulfate (Morphine 4 Mg/1 Ml Inj)  4 mg IV Q4H PRN


   PRN Reason: Pain , Severe (7-10)


Ondansetron HCl (Ondansetron 4 Mg/2 Ml Inj)  4 mg IV Q8H PRN


   PRN Reason: Nausea And Vomiting


Sodium Chloride (Sodium Chloride 0.9% 10 Ml Flush Syringe)  10 ml IV BID ISAAC


   Last Admin: 06/19/22 10:13 Dose:  10 ml


   


Sodium Chloride (Sodium Chloride 0.9% 10 Ml Flush Syringe)  10 ml IV PRN PRN


   PRN Reason: LINE FLUSH











Review of Systems


Constitutional: no fever, no chills


Ears, nose, mouth and throat: no ear pain, no ear discharge, no hoarseness


Cardiovascular: orthopnea, syncope, shortness of breath, dyspnea on exertion, no

 chest pain, no leg edema


Respiratory: shortness of breath, no cough, no hemoptysis


Gastrointestinal: no abdominal pain, no nausea, no vomiting, no diarrhea, no 

constipation


Genitourinary Female: no dysuria, no urinary frequency


Rectal: no pain, no bleeding


Musculoskeletal: no neck stiffness, no neck pain, no myalgias


Integumentary: no rash, no pruritis


Neurological: no weakness, no parathesias, no headaches


Endocrine: no cold intolerance, no heat intolerance


Hematologic/Lymphatic: no easy bruising, no easy bleeding


Allergic/Immunologic: no urticaria





Physical Examination


                                   Vital Signs








                                Last Vital Signs











Temp  98.9 F   06/19/22 05:42


 


Pulse  124 H  06/19/22 14:50


 


Resp  18   06/19/22 14:50


 


BP  128/57   06/19/22 05:42


 


Pulse Ox  98   06/19/22 08:58











General appearance: no acute distress


HEENT: Positive: EOMI, Normocephaly, Mucus Membranes Moist


Neck: Positive: neck supple, trachea midline, JVD/HJR


Cardiac: Positive: Reg Rate and Rhythm, S1/S2


Lungs: Positive: clear to auscultation


Neuro: Positive: Grossly Intact


Abdomen: Positive: Soft, Active Bowel Sounds.  Negative: Tender


Musculoskeletal: Normal Range of Motion


Extremities: Present: normal.  Absent: edema





Results





                                 06/19/22 05:35





                                 06/19/22 05:35


                                 Cardiac Enzymes











  06/18/22 Range/Units





  14:35 


 


AST  225 H  (5-40)  units/L








                                       CBC











  06/19/22 Range/Units





  05:35 


 


WBC  6.6  (4.5-11.0)  K/mm3


 


RBC  4.44  (3.65-5.03)  M/mm3


 


Hgb  10.0 L  (10.1-14.3)  gm/dl


 


Hct  32.1  (30.3-42.9)  %


 


Plt Count  181  (140-440)  K/mm3


 


Lymph # (Auto)  3.0  (1.2-5.4)  K/mm3


 


Mono # (Auto)  0.5  (0.0-0.8)  K/mm3


 


Eos # (Auto)  0.1  (0.0-0.4)  K/mm3


 


Baso # (Auto)  0.1  (0.0-0.1)  K/mm3








                          Comprehensive Metabolic Panel











  06/18/22 06/19/22 Range/Units





  14:35 05:35 


 


Sodium  136 L  140  (137-145)  mmol/L


 


Potassium  4.3  3.9  (3.6-5.0)  mmol/L


 


Chloride  99.7  100.1  ()  mmol/L


 


Carbon Dioxide  18 L  22  (22-30)  mmol/L


 


BUN  12  14  (7-17)  mg/dL


 


Creatinine  0.7  0.7  (0.6-1.2)  mg/dL


 


Glucose  523 H*  411 H  ()  mg/dL


 


Calcium  9.6  9.6  (8.4-10.2)  mg/dL


 


AST  225 H   (5-40)  units/L


 


ALT  110 H   (7-56)  units/L


 


Alkaline Phosphatase  101   ()  units/L


 


Total Protein  6.9   (6.3-8.2)  g/dL


 


Albumin  4.1   (3.9-5)  g/dL














- Imaging and Cardiology


EKG: image reviewed





EKG interpretations





- Telemetry


EKG Rhythm: Sinus Rhythm





- EKG


Sinus rhythms and dysrhythmias: sinus rhythm


Supraventricular dysrhythmia: atrial premature complexe


Repolarization changes or abnormalities: ST or T wave suggestive of ischemia





Assessment and Plan


Agree with current diuretic therapy. Initiate beta blocker for control of AF. 








- Patient Problems


(1) Acute HFrEF (heart failure with reduced ejection fraction)


Current Visit: Yes   Status: Acute   





(2) Paroxysmal atrial fibrillation with RVR


Current Visit: Yes   Status: Acute   





(3) COPD (chronic obstructive pulmonary disease)


Current Visit: Yes   Status: Chronic   





(4) CARMELITA (obstructive sleep apnea)


Current Visit: Yes   Status: Chronic   





(5) Hypertension


Current Visit: Yes   Status: Chronic   


Qualifiers: 


   Hypertension type: primary hypertension   Qualified Code(s): I10 - Essential 

(primary) hypertension   





(6) Diabetes mellitus


Current Visit: Yes   Status: Chronic

## 2022-06-19 NOTE — PROGRESS NOTE
Assessment and Plan


Assessment and plan: 


VTE prophylaxis?: Chemical


Plan of care discussed with patient/family: Yes





- Patient Problems


--Acute hypoxic respiratory failure; present on admission


Requiring BiPAP, wean as tolerated


Oxygen titrate O2 sats more than 90%


Treat underlying congestive heart failure diuretics


Nebulizers if needed





- Acute exacerbation of systolic CHF (congestive heart failure)


Admit the patient to the medical telemetry.  Patient is on BiPAP.  


Lasix 40 mg IV every 12 hours.  We will do the serial cardiac enzymes.  


Echocardiogram, fluid restriction.  Maintain input output.  Cardiology 

evaluation


LVEF; 40 to 45%





-- Diabetes melitis/uncontrolled


Accu-Chek every 6 hours with Humalog high-dose coverage as.  


Sliding scale coverage, glipizide added


Closely monitor,Diabetic education





-- Hypertension


Hydralazine 10 mg IV every 6 hours as needed.  


We will continue the home medication





--Paroxysmal A-fib rate controlled


Stable.  We will continue the home medication.  


Echocardiogram.  Cardiology evaluation


On beta-blockers and chronic anticoagulant Eliquis


Cardiology following





-- Tobacco abuse


Patient is a former tobacco user.  


We counseled regarding quitting smoking





--Obesity; BMI 35.6;


Diet modification, lifestyle changes, exercise as tolerated


And weight reduction when medically stable





 --Full CODE STATUS 





--DVT prophylaxis


Heparin 5000 units subcu every 12 hours for DVT prophylaxis.  


Pepcid 20 mg p.o. twice daily for GI prophylaxis.  Patient is a full code





Closely monitor the patient and adjust management as needed


Cardiology consult and recommendations noted and appreciated


Discharge planning per case management when patient is stable














History


Interval history: 


I have seen and examined the patient at the bedside


Patient's chart and medications reviewed


Patient was admitted with acute hypoxic respiratory failure requiring BiPAP


Patient's feels slightly better


Patient is morbidly obese


Vital signs noted








Hospitalist Physical





- Constitutional


Vitals: 


                                        











Temp Pulse Resp BP Pulse Ox


 


 98.9 F   76   16   128/57   98 


 


 06/19/22 05:42  06/19/22 08:57  06/19/22 08:57  06/19/22 05:42  06/19/22 08:58











General appearance: Present: no acute distress, well-nourished, obese (Morbidly 

obese)





- EENT


Eyes: Present: PERRL, EOM intact





- Neck


Neck: Present: supple, normal ROM





- Respiratory


Respiratory effort: normal


Respiratory: bilateral: diminished, negative: rales, rhonchi, wheezing





- Cardiovascular


Rhythm: regular


Heart Sounds: Present: S1 & S2





- Extremities


Extremities: no ischemia, No edema





- Abdominal


General gastrointestinal: soft, non-tender, non-distended, normal bowel sounds





- Integumentary


Integumentary: Present: clear, warm





- Psychiatric


Psychiatric: appropriate mood/affect, cooperative





- Neurologic


Neurologic: moves all extremities





HEART Score





- HEART Score


Troponin: 


                                        











Troponin T  < 0.010 ng/mL (0.00-0.029)   06/18/22  14:35    














Results





- Labs


CBC & Chem 7: 


                                 06/19/22 16:38





                                 06/19/22 16:38


Labs: 


                             Laboratory Last Values











WBC  6.6 K/mm3 (4.5-11.0)   06/19/22  05:35    


 


RBC  4.44 M/mm3 (3.65-5.03)   06/19/22  05:35    


 


Hgb  10.0 gm/dl (10.1-14.3)  L  06/19/22  05:35    


 


Hct  32.1 % (30.3-42.9)   06/19/22  05:35    


 


MCV  72 fl (79-97)  L  06/19/22  05:35    


 


MCH  23 pg (28-32)  L  06/19/22  05:35    


 


MCHC  31 % (30-34)   06/19/22  05:35    


 


RDW  15.0 % (13.2-15.2)   06/19/22  05:35    


 


Plt Count  181 K/mm3 (140-440)   06/19/22  05:35    


 


Lymph % (Auto)  45.5 % (13.4-35.0)  H  06/19/22  05:35    


 


Mono % (Auto)  8.2 % (0.0-7.3)  H  06/19/22  05:35    


 


Eos % (Auto)  1.3 % (0.0-4.3)   06/19/22  05:35    


 


Baso % (Auto)  0.8 % (0.0-1.8)   06/19/22  05:35    


 


Lymph # (Auto)  3.0 K/mm3 (1.2-5.4)   06/19/22  05:35    


 


Mono # (Auto)  0.5 K/mm3 (0.0-0.8)   06/19/22  05:35    


 


Eos # (Auto)  0.1 K/mm3 (0.0-0.4)   06/19/22  05:35    


 


Baso # (Auto)  0.1 K/mm3 (0.0-0.1)   06/19/22  05:35    


 


Seg Neutrophils %  44.2 % (40.0-70.0)   06/19/22  05:35    


 


Seg Neutrophils #  2.9 K/mm3 (1.8-7.7)   06/19/22  05:35    


 


ABG pH  7.277 pH Units (7.350-7.450)  L  06/18/22  14:15    


 


ABG pCO2  44.7 mm Hg  06/18/22  14:15    


 


ABG pO2  178.4 mm Hg (80.0-90.0)  H  06/18/22  14:15    


 


ABG HCO3  20.4 mmol/L (20.0-26.0)   06/18/22  14:15    


 


ABG O2 Saturation  99.0 % (95.0-99.0)   06/18/22  14:15    


 


ABG O2 Content  15.2  (0.0-44)   06/18/22  14:15    


 


ABG Base Excess  -6.1 mmol/L (-2.0-3.0)  L  06/18/22  14:15    


 


ABG Hemoglobin  10.9 gm/dl (12.0-16.0)  L  06/18/22  14:15    


 


ABG Carboxyhemoglobin  1.6 % (0.0-5.0)   06/18/22  14:15    


 


ABG Methemoglobin  0.5 % (0.0-1.5)   06/18/22  14:15    


 


Oxyhemoglobin  97.0 % (95.0-99.0)   06/18/22  14:15    


 


FiO2  60 %  06/18/22  14:15    


 


Sodium  140 mmol/L (137-145)   06/19/22  05:35    


 


Potassium  3.9 mmol/L (3.6-5.0)   06/19/22  05:35    


 


Chloride  100.1 mmol/L ()   06/19/22  05:35    


 


Carbon Dioxide  22 mmol/L (22-30)   06/19/22  05:35    


 


Anion Gap  22 mmol/L  06/19/22  05:35    


 


BUN  14 mg/dL (7-17)   06/19/22  05:35    


 


Creatinine  0.7 mg/dL (0.6-1.2)   06/19/22  05:35    


 


Estimated GFR  > 60 ml/min  06/19/22  05:35    


 


BUN/Creatinine Ratio  20 %  06/19/22  05:35    


 


Glucose  411 mg/dL ()  H  06/19/22  05:35    


 


POC Glucose  368 mg/dL ()  H  06/19/22  05:24    


 


Calcium  9.6 mg/dL (8.4-10.2)   06/19/22  05:35    


 


Total Bilirubin  0.40 mg/dL (0.1-1.2)   06/18/22  14:35    


 


AST  225 units/L (5-40)  H  06/18/22  14:35    


 


ALT  110 units/L (7-56)  H  06/18/22  14:35    


 


Alkaline Phosphatase  101 units/L ()   06/18/22  14:35    


 


Troponin T  < 0.010 ng/mL (0.00-0.029)   06/18/22  14:35    


 


NT-Pro-B Natriuret Pep  3670 pg/mL (0-900)  H  06/18/22  14:35    


 


Total Protein  6.9 g/dL (6.3-8.2)   06/18/22  14:35    


 


Albumin  4.1 g/dL (3.9-5)   06/18/22  14:35    


 


Albumin/Globulin Ratio  1.5 %  06/18/22  14:35    











Valentine/IV: 


                                        





Voiding Method                   Toilet











Active Medications





- Current Medications


Current Medications: 














Generic Name Dose Route Start Last Admin





  Trade Name Freq  PRN Reason Stop Dose Admin


 


Acetaminophen  650 mg  06/18/22 19:37 





  Acetaminophen 325 Mg Tab  PO  





  Q4H PRN  





  Pain MILD(1-3)/Fever >100.5/HA  


 


Albuterol  2.5 mg  06/18/22 19:37 





  Albuterol 2.5 Mg/3 Ml Nebu  IH  





  Q3HRT PRN  





  Shortness Of Breath  


 


Albuterol/Ipratropium  1 ampul  06/18/22 20:00  06/19/22 08:56





  Ipratropium/Albuterol Sulfate 3 Ml Ampul.Neb  IH   1 ampul





  Q6HRT ISAAC   Administration


 


Dextrose  50 ml  06/18/22 19:37 





  Dextrose 50% In Water (25gm) 50 Ml Syringe  IV  





  Q30MIN PRN  





  Hypoglycemia  





  Protocol  


 


Famotidine  20 mg  06/18/22 22:00  06/18/22 21:30





  Famotidine 20 Mg Tab  PO   20 mg





  BID ISAAC   Administration


 


Furosemide  40 mg  06/19/22 06:00  06/19/22 05:41





  Furosemide 40 Mg/4 Ml Inj  IV   40 mg





  BID@0600,1800 ISAAC   Administration


 


Glipizide  10 mg  06/20/22 08:00 





  Glipizide Xl 10 Mg Tab  PO  





  QDDIAB ISAAC  


 


Glipizide  10 mg  06/19/22 09:20 





  Glipizide Xl 10 Mg Tab  PO  06/19/22 09:21 





  ONCE ONE  


 


Heparin Sodium (Porcine)  5,000 unit  06/18/22 22:00  06/18/22 21:30





  Heparin 5,000 Unit/1 Ml Vial  SUB-Q   5,000 unit





  Q12HR ISAAC   Administration


 


Hydralazine HCl  10 mg  06/18/22 19:54 





  Hydralazine 20 Mg/1 Ml Inj  IV  





  Q6H PRN  





  Blood Pressure  


 


Insulin Human Lispro  0 unit  06/19/22 00:00  06/19/22 05:32





  Insulin Lispro 100 Unit/Ml  SUB-Q   10 unit





  Q6HR ISAAC   Administration





  Protocol  


 


Morphine Sulfate  2 mg  06/18/22 19:37 





  Morphine 2 Mg/1 Ml Inj  IV  





  Q4H PRN  





  Pain, Moderate (4-6)  


 


Morphine Sulfate  4 mg  06/18/22 19:37 





  Morphine 4 Mg/1 Ml Inj  IV  





  Q4H PRN  





  Pain , Severe (7-10)  


 


Ondansetron HCl  4 mg  06/18/22 19:37 





  Ondansetron 4 Mg/2 Ml Inj  IV  





  Q8H PRN  





  Nausea And Vomiting  


 


Sodium Chloride  10 ml  06/18/22 22:00  06/18/22 21:30





  Sodium Chloride 0.9% 10 Ml Flush Syringe  IV   10 ml





  BID ISAAC   Administration


 


Sodium Chloride  10 ml  06/18/22 19:37 





  Sodium Chloride 0.9% 10 Ml Flush Syringe  IV  





  PRN PRN  





  LINE FLUSH

## 2022-06-20 LAB
APTT BLD: 34.7 SEC. (ref 24.2–36.6)
HCT VFR BLD CALC: 35 % (ref 30.3–42.9)
HGB BLD-MCNC: 10.8 GM/DL (ref 10.1–14.3)
INR PPP: 1.03 (ref 0.87–1.13)
MCHC RBC AUTO-ENTMCNC: 31 % (ref 30–34)
MCV RBC AUTO: 73 FL (ref 79–97)
PLATELET # BLD: 205 K/MM3 (ref 140–440)
RBC # BLD AUTO: 4.81 M/MM3 (ref 3.65–5.03)

## 2022-06-20 RX ADMIN — Medication SCH ML: at 21:32

## 2022-06-20 RX ADMIN — IPRATROPIUM BROMIDE AND ALBUTEROL SULFATE SCH AMPUL: .5; 3 SOLUTION RESPIRATORY (INHALATION) at 09:30

## 2022-06-20 RX ADMIN — FUROSEMIDE SCH: 10 INJECTION, SOLUTION INTRAVENOUS at 06:04

## 2022-06-20 RX ADMIN — FAMOTIDINE SCH MG: 20 TABLET ORAL at 09:54

## 2022-06-20 RX ADMIN — INSULIN LISPRO SCH UNIT: 100 INJECTION, SOLUTION INTRAVENOUS; SUBCUTANEOUS at 09:55

## 2022-06-20 RX ADMIN — METOPROLOL SUCCINATE SCH MG: 25 TABLET, FILM COATED, EXTENDED RELEASE ORAL at 21:27

## 2022-06-20 RX ADMIN — INSULIN LISPRO SCH UNIT: 100 INJECTION, SOLUTION INTRAVENOUS; SUBCUTANEOUS at 18:55

## 2022-06-20 RX ADMIN — DIGOXIN SCH: 250 INJECTION, SOLUTION INTRAMUSCULAR; INTRAVENOUS; PARENTERAL at 18:56

## 2022-06-20 RX ADMIN — IPRATROPIUM BROMIDE AND ALBUTEROL SULFATE SCH AMPUL: .5; 3 SOLUTION RESPIRATORY (INHALATION) at 21:46

## 2022-06-20 RX ADMIN — Medication SCH ML: at 09:54

## 2022-06-20 RX ADMIN — INSULIN LISPRO SCH UNIT: 100 INJECTION, SOLUTION INTRAVENOUS; SUBCUTANEOUS at 16:40

## 2022-06-20 RX ADMIN — IPRATROPIUM BROMIDE AND ALBUTEROL SULFATE SCH: .5; 3 SOLUTION RESPIRATORY (INHALATION) at 02:21

## 2022-06-20 RX ADMIN — INSULIN LISPRO SCH: 100 INJECTION, SOLUTION INTRAVENOUS; SUBCUTANEOUS at 07:09

## 2022-06-20 RX ADMIN — DIGOXIN SCH: 250 INJECTION, SOLUTION INTRAMUSCULAR; INTRAVENOUS; PARENTERAL at 21:26

## 2022-06-20 RX ADMIN — METOPROLOL SUCCINATE SCH MG: 25 TABLET, FILM COATED, EXTENDED RELEASE ORAL at 09:55

## 2022-06-20 RX ADMIN — IPRATROPIUM BROMIDE AND ALBUTEROL SULFATE SCH AMPUL: .5; 3 SOLUTION RESPIRATORY (INHALATION) at 13:59

## 2022-06-20 RX ADMIN — FUROSEMIDE SCH MG: 20 TABLET ORAL at 13:55

## 2022-06-20 RX ADMIN — FAMOTIDINE SCH MG: 20 TABLET ORAL at 21:29

## 2022-06-20 RX ADMIN — INSULIN LISPRO SCH UNIT: 100 INJECTION, SOLUTION INTRAVENOUS; SUBCUTANEOUS at 22:00

## 2022-06-20 NOTE — PROGRESS NOTE
Assessment and Plan





The patient is a 76-year-old female with a past medical history of history of 

paroxysmal atrial fibrillation, HFrEF, hypertension, CARMELITA who presented to the ED

with complaint of shortness of breath. 





Acute on chronic HFrEF


Acute respiratory failure


Paroxysmal A. fib


COPD


Hypertension


Diabetes


CARMELITA











Echocardiogram of 5/18/22 - Left ventricular ejection fraction is 40-45%. Entire

inferior wall and basal and mid inferolateral wall are abnormal. Mild aortic 

valve insufficiency. There is moderately increased filling pressure consistent 

with grade 2 diastolic dysfunction. Mildly increased left ventricular wall 

thickness. Mild mitral valve regurgitation.


[facility] (12/2/2020) - Outside Facility - [facility]- Moderately severe

diffuse coronary calcification 100% proximal RCA stenosis There are R to R 

collaterals to the PDA.  There are left to right collaterals to the PDA 50-60% 

(non critical) mid LAD stenosis Moderate luminal disease in the left circumflex 

artery LVEF 50-55% with inferior wall severe hypokinesis/akinesis.  Recommend 

maximal medical therapy





Plan:


Continue metoprolol XL 12.5 mg p.o. twice daily


Patient appears euvolemic on exam.  Will stop Lasix IV and convert to outpatient

Lasix 20 mg p.o. daily


Agree with stopping Eliquis and converting to outpatient Xarelto for 

anticoagulation


Patient on rosuvastatin 10 mg p.o. nightly as an outpatient however rosuvastatin

not on formulary will convert atorvastatin 20 mg p.o. nightly.  We will resume 

outpatient aspirin


Will initiate IV digoxin for further rate control


Patient reports recent stress test performed by her primary cardiologist during 

outpatient with results pending.  Will defer ischemic eval due to recent 

outpatient stress test


No ACE or ARB due to low BP.  Will defer to outpatient cardiologist for 

initiation








Patient in conjunction with Dr. Wharton who agrees with this plan of care








- Patient Problems


(1) Diabetes


Current Visit: Yes   Status: Acute   





(2) Hypertension


Current Visit: Yes   Status: Chronic   


Qualifiers: 


   Hypertension type: primary hypertension   Qualified Code(s): I10 - Essential 

(primary) hypertension   





(3) Tobacco abuse


Current Visit: Yes   Status: Acute   





(4) Acute HFrEF (heart failure with reduced ejection fraction)


Current Visit: Yes   Status: Acute   





(5) Paroxysmal atrial fibrillation with RVR


Current Visit: Yes   Status: Acute   





(6) COPD (chronic obstructive pulmonary disease)


Current Visit: Yes   Status: Chronic   





(7) CARMELITA (obstructive sleep apnea)


Current Visit: Yes   Status: Chronic   





Subjective


Date of service: 06/20/22


Principal diagnosis: Acute on chronic HFrEF


Interval history: 





Patient resting in bed in no acute distress.


A. fib 90s on monitor





Objective


                                   Vital Signs











  Temp Pulse Pulse Pulse Resp Resp Resp


 


 06/20/22 09:38       


 


 06/20/22 09:36    111 H  119 H   18  18


 


 06/20/22 04:35  98.9 F  100 H    20  


 


 06/19/22 23:03   129 H     


 


 06/19/22 22:00   96 H     


 


 06/19/22 21:14       


 


 06/19/22 20:10  98.4 F  96 H    18  


 


 06/19/22 20:00     112 H    16


 


 06/19/22 14:50     124 H    18


 


 06/19/22 13:00   78     














  BP BP Pulse Ox


 


 06/20/22 09:38    97


 


 06/20/22 09:36   


 


 06/20/22 04:35   94/53  98


 


 06/19/22 23:03   


 


 06/19/22 22:00  117/74   96


 


 06/19/22 21:14    96


 


 06/19/22 20:10   117/74  97


 


 06/19/22 20:00   


 


 06/19/22 14:50   


 


 06/19/22 13:00   














- Physical Examination


General: No Apparent Distress


HEENT: Positive: EOMI, Normocephaly, Mucus Membranes Moist


Neck: Positive: neck supple, trachea midline, JVD/HJR


Cardiac: Positive: irregularly irregular


Lungs: Positive: Normal Breath Sounds


Neuro: Positive: Grossly Intact


Abdomen: Positive: Soft, Active Bowel Sounds.  Negative: Tender


Skin: Negative: Rash, Suspicious Lesions, Ulceration


Musculoskeletal: Normal Range of Motion


Extremities: Present: normal.  Absent: edema





- Labs and Meds


                                   Coagulation











  06/19/22 Range/Units





  16:38 


 


PT  13.7  (12.2-14.9)  Sec.


 


INR  0.95  (0.87-1.13)  


 


APTT  35.3  (24.2-36.6)  Sec.








                                       CBC











  06/19/22 Range/Units





  16:38 


 


WBC  6.0  (4.5-11.0)  K/mm3


 


RBC  4.56  (3.65-5.03)  M/mm3


 


Hgb  10.2  (10.1-14.3)  gm/dl


 


Hct  33.1  (30.3-42.9)  %


 


Plt Count  184  (140-440)  K/mm3








                          Comprehensive Metabolic Panel











  06/19/22 Range/Units





  16:38 


 


Creatinine  0.7  (0.6-1.2)  mg/dL














- Imaging and Cardiology


EKG: image reviewed





- Telemetry


EKG Rhythm: Atrial Fibrillation





- EKG


Sinus rhythms and dysrhythmias: sinus rhythm


Supraventricular dysrhythmia: atrial fibrillation


Repolarization changes or abnormalities: ST or T wave suggestive of ischemia

## 2022-06-21 VITALS — SYSTOLIC BLOOD PRESSURE: 112 MMHG | DIASTOLIC BLOOD PRESSURE: 80 MMHG

## 2022-06-21 LAB
BUN SERPL-MCNC: 20 MG/DL (ref 7–17)
BUN/CREAT SERPL: 29 %
CALCIUM SERPL-MCNC: 9.3 MG/DL (ref 8.4–10.2)
HCT VFR BLD CALC: 33.6 % (ref 30.3–42.9)
HEMOLYSIS INDEX: 1
HGB BLD-MCNC: 10.6 GM/DL (ref 10.1–14.3)
MCHC RBC AUTO-ENTMCNC: 32 % (ref 30–34)
MCV RBC AUTO: 72 FL (ref 79–97)
PLATELET # BLD: 209 K/MM3 (ref 140–440)
RBC # BLD AUTO: 4.69 M/MM3 (ref 3.65–5.03)

## 2022-06-21 RX ADMIN — FUROSEMIDE SCH MG: 20 TABLET ORAL at 11:02

## 2022-06-21 RX ADMIN — INSULIN LISPRO SCH UNIT: 100 INJECTION, SOLUTION INTRAVENOUS; SUBCUTANEOUS at 08:15

## 2022-06-21 RX ADMIN — IPRATROPIUM BROMIDE AND ALBUTEROL SULFATE SCH: .5; 3 SOLUTION RESPIRATORY (INHALATION) at 14:10

## 2022-06-21 RX ADMIN — IPRATROPIUM BROMIDE AND ALBUTEROL SULFATE SCH: .5; 3 SOLUTION RESPIRATORY (INHALATION) at 02:27

## 2022-06-21 RX ADMIN — IPRATROPIUM BROMIDE AND ALBUTEROL SULFATE SCH AMPUL: .5; 3 SOLUTION RESPIRATORY (INHALATION) at 09:30

## 2022-06-21 RX ADMIN — METOPROLOL SUCCINATE SCH MG: 25 TABLET, FILM COATED, EXTENDED RELEASE ORAL at 11:03

## 2022-06-21 RX ADMIN — DIGOXIN SCH MG: 250 INJECTION, SOLUTION INTRAMUSCULAR; INTRAVENOUS; PARENTERAL at 06:32

## 2022-06-21 RX ADMIN — DIGOXIN SCH MG: 250 INJECTION, SOLUTION INTRAMUSCULAR; INTRAVENOUS; PARENTERAL at 01:02

## 2022-06-21 RX ADMIN — FAMOTIDINE SCH MG: 20 TABLET ORAL at 11:02

## 2022-06-21 RX ADMIN — Medication SCH ML: at 11:02

## 2022-06-21 RX ADMIN — INSULIN LISPRO SCH UNIT: 100 INJECTION, SOLUTION INTRAVENOUS; SUBCUTANEOUS at 12:26

## 2022-06-21 NOTE — DISCHARGE SUMMARY
Providers





- Providers


Date of Admission: 


06/18/22 19:37





Date of discharge: 06/21/22


Attending physician: 


LAILA BRODY





                                        





06/18/22 19:37


Consult to Dietitian/Nutrition [CONS] Routine 


   Physician Instructions: 


   Reason For Exam: 


   Reason for Consult: Diet education


Consult to Physician [CONS] Routine 


   Comment: 


   Consulting Provider: ROMARIO STERLING


   Physician Instructions: 


   Reason For Exam: chf











Primary care physician: 


LUDMILA CARRION








Hospitalization


Condition: Stable


Hospital course: 








The patient is a 76-year-old female with a past medical history of history of 

paroxysmal atrial fibrillation, HFrEF, hypertension, CARMELITA who presented to the ED

 with complaint of shortness of breath. 





Echocardiogram of 5/18/22 - Left ventricular ejection fraction is 40-45%. Entire

 inferior wall and basal and mid inferolateral wall are abnormal. Mild aortic 

valve insufficiency. There is moderately increased filling pressure consistent 

with grade 2 diastolic dysfunction. Mildly increased left ventricular wall 

thickness. Mild mitral valve regurgitation.


[facility] (12/2/2020) - Outside Facility - [facility]- Moderately severe

 diffuse coronary calcification 100% proximal RCA stenosis There are R to R 

collaterals to the PDA.  There are left to right collaterals to the PDA 50-60% 

(non critical) mid LAD stenosis Moderate luminal disease in the left circumflex 

artery LVEF 50-55% with inferior wall severe hypokinesis/akinesis.  Recommend 

maximal medical therapy





Patient was admitted to telemetry floor with scheduled iv diuretics. Monitored 

with serial CE, EKG. Cardiology consulted, 2d echo obtained. Provided cardiac 

diet, daily weights, monitored in's and O's. Patients symptom improved with 

medical management. Patient was then discharged home in stable condition with 

outpt f/u.





On discharge patient will Continue metoprolol XL 12.5 mg p.o. twice daily, Lasix

 20 mg p.o. daily, asa, and statin therapy and Xarelto for anticoagulation.


No ACE or ARB due to low BP.  Plan of care discussed with patient who verbalized

 understanding and acknowledgment.











Disposition: 01 HOME / SELF CARE / HOMELESS


Final Discharge Diagnosis (Prints w/discharge instructions): Acute on chronic 

HFrEF.  Acute respiratory failure.  Paroxysmal A. fib.  COPD.  Hypertension.  

Diabetes.  CARMELITA


Time spent for discharge: 34 minutes





Core Measure Documentation





- Palliative Care


Palliative Care/ Comfort Measures: Not Applicable





- Core Measures


Any of the following diagnoses?: heart failure





- Heart Failure Discharge Requirements


ACE/ARB for LVSD if EF <40%: Not Applicable


Reason for no ACE/ARB: Hypotension


Beta blocker at discharge: Yes





Exam





- Constitutional


Vitals: 


                                        











Temp Pulse Resp BP Pulse Ox


 


 97.9 F   76   18   112/80   96 


 


 06/21/22 08:35  06/21/22 11:03  06/21/22 08:35  06/21/22 11:03  06/21/22 08:35














Plan


Activity: advance as tolerated


Weight Bearing Status: Weight Bear as Tolerated


Diet: low fat, low salt


Special Instructions: restrict fluid intake to (1.2L daily )


Additional Instructions: outpt sleep study for CPAP


Follow up with: 


LUDMILA CARRION MD [Primary Care Provider] - 3-5 Days


FAYE HARRIS MD [Staff Physician] - 7 Days


Prescriptions: 


Digoxin [Lanoxin] 0.125 mg PO DAILY #30 tablet


Furosemide [Lasix] 20 mg PO QDAY #30 tablet

## 2022-06-21 NOTE — PROGRESS NOTE
Assessment and Plan





The patient is a 76-year-old female with a past medical history of history of 

paroxysmal atrial fibrillation, HFrEF, hypertension, CARMELITA who presented to the ED

with complaint of shortness of breath. 





Acute on chronic HFrEF


Acute respiratory failure


Paroxysmal A. fib


COPD


Hypertension


Diabetes


CARMELITA











Echocardiogram of 5/18/22 - Left ventricular ejection fraction is 40-45%. Entire

inferior wall and basal and mid inferolateral wall are abnormal. Mild aortic 

valve insufficiency. There is moderately increased filling pressure consistent 

with grade 2 diastolic dysfunction. Mildly increased left ventricular wall 

thickness. Mild mitral valve regurgitation.


[facility] (12/2/2020) - Outside Facility - [facility]- Moderately severe

diffuse coronary calcification 100% proximal RCA stenosis There are R to R 

collaterals to the PDA.  There are left to right collaterals to the PDA 50-60% 

(non critical) mid LAD stenosis Moderate luminal disease in the left circumflex 

artery LVEF 50-55% with inferior wall severe hypokinesis/akinesis.  Recommend 

maximal medical therapy





Plan:


Continue metoprolol XL 12.5 mg p.o. twice daily


Continue Lasix 20 mg p.o. daily, asa, and statin therapy


Continue Xarelto for anticoagulation


Telemetry reviewed patient A. fib 70s on monitor.  Will initiate digoxin 0.125 

mg p.o. daily


Patient reports recent stress test performed by her primary cardiologist during 

outpatient with results pending.  Will defer ischemic eval due to recent 

outpatient stress test


No ACE or ARB due to low BP.  Will defer to outpatient cardiologist for 

initiation


Plan of care discussed with patient who verbalized understanding and acknowledgm

ent


Cardiac status otherwise stable for discharge





Patient follow with her primary cardiologist in 1 to 2 weeks after discharge


Patient in conjunction with Dr. Wharton who agrees with this plan of care








- Patient Problems


(1) Diabetes


Current Visit: Yes   Status: Acute   





(2) Hypertension


Current Visit: Yes   Status: Chronic   


Qualifiers: 


   Hypertension type: primary hypertension   Qualified Code(s): I10 - Essential 

(primary) hypertension   





(3) Tobacco abuse


Current Visit: Yes   Status: Acute   





(4) Acute HFrEF (heart failure with reduced ejection fraction)


Current Visit: Yes   Status: Acute   





(5) Paroxysmal atrial fibrillation with RVR


Current Visit: Yes   Status: Acute   





(6) COPD (chronic obstructive pulmonary disease)


Current Visit: Yes   Status: Chronic   





(7) CARMELITA (obstructive sleep apnea)


Current Visit: Yes   Status: Chronic   





Subjective


Date of service: 06/21/22


Principal diagnosis: Acute on chronic HFrEF


Interval history: 





Patient resting in bed in no acute distress.


A. fib 70s on monitor





Objective


                                   Vital Signs











  Temp Pulse Pulse Pulse Resp Resp Resp


 


 06/21/22 08:35  97.9 F  77    18  


 


 06/21/22 08:00    71    18 


 


 06/21/22 06:32   117 H     


 


 06/21/22 03:32  98.1 F  117 H    18  


 


 06/21/22 03:29  98.5 F  73    16  


 


 06/21/22 01:02   94 H     


 


 06/20/22 23:22  97.7 F  89    16  


 


 06/20/22 22:00   110 H     


 


 06/20/22 21:48       


 


 06/20/22 21:27   94 H     


 


 06/20/22 20:00    106 H  106 H   18  18


 


 06/20/22 19:52  97.2 F L  94 H    16  


 


 06/20/22 13:59    109 H  111 H   18  18














  BP Pulse Ox


 


 06/21/22 08:35  107/80  96


 


 06/21/22 08:00  


 


 06/21/22 06:32  119/63 


 


 06/21/22 03:32  119/63  93


 


 06/21/22 03:29  94/52  96


 


 06/21/22 01:02  116/79 


 


 06/20/22 23:22  112/70  96


 


 06/20/22 22:00   96


 


 06/20/22 21:48   97


 


 06/20/22 21:27  116/79 


 


 06/20/22 20:00  


 


 06/20/22 19:52  116/79  96


 


 06/20/22 13:59  














- Physical Examination


General: No Apparent Distress


HEENT: Positive: EOMI, Normocephaly, Mucus Membranes Moist


Neck: Positive: neck supple, trachea midline, JVD/HJR


Cardiac: Positive: irregularly irregular


Lungs: Positive: Normal Breath Sounds


Neuro: Positive: Grossly Intact


Abdomen: Positive: Soft, Active Bowel Sounds.  Negative: Tender


Skin: Negative: Rash, Suspicious Lesions, Ulceration


Musculoskeletal: Normal Range of Motion


Extremities: Present: normal.  Absent: edema





- Labs and Meds


                                   Coagulation











  06/20/22 Range/Units





  12:03 


 


PT  14.7  (12.2-14.9)  Sec.


 


INR  1.03  (0.87-1.13)  


 


APTT  34.7  (24.2-36.6)  Sec.








                                       CBC











  06/20/22 06/21/22 Range/Units





  12:03 05:18 


 


WBC  6.1  7.2  (4.5-11.0)  K/mm3


 


RBC  4.81  4.69  (3.65-5.03)  M/mm3


 


Hgb  10.8  10.6  (10.1-14.3)  gm/dl


 


Hct  35.0  33.6  (30.3-42.9)  %


 


Plt Count  205  209  (140-440)  K/mm3








                          Comprehensive Metabolic Panel











  06/20/22 06/21/22 Range/Units





  12:03 05:18 


 


Sodium   139  (137-145)  mmol/L


 


Potassium   3.7  (3.6-5.0)  mmol/L


 


Chloride   101.1  ()  mmol/L


 


Carbon Dioxide   26  (22-30)  mmol/L


 


BUN   20 H  (7-17)  mg/dL


 


Creatinine  0.6  0.7  (0.6-1.2)  mg/dL


 


Glucose   202 H  ()  mg/dL


 


Calcium   9.3  (8.4-10.2)  mg/dL














- Imaging and Cardiology


EKG: image reviewed





- Telemetry


EKG Rhythm: Atrial Fibrillation





- EKG


Sinus rhythms and dysrhythmias: sinus rhythm


Supraventricular dysrhythmia: atrial fibrillation


Repolarization changes or abnormalities: ST or T wave suggestive of ischemia